# Patient Record
Sex: MALE | Race: WHITE | Employment: FULL TIME | URBAN - METROPOLITAN AREA
[De-identification: names, ages, dates, MRNs, and addresses within clinical notes are randomized per-mention and may not be internally consistent; named-entity substitution may affect disease eponyms.]

---

## 2018-04-07 ENCOUNTER — HOSPITAL ENCOUNTER (OUTPATIENT)
Age: 58
Discharge: HOME | End: 2018-04-07
Payer: COMMERCIAL

## 2018-04-07 PROCEDURE — 87491 CHLMYD TRACH DNA AMP PROBE: CPT

## 2018-04-07 PROCEDURE — 87591 N.GONORRHOEAE DNA AMP PROB: CPT

## 2018-04-11 LAB
CHLAMYDIA TRACHOMATIS AMPLIFIED DET: NORMAL
N GONORRHOEAE AMPLIFIED DET: NORMAL

## 2019-11-06 ENCOUNTER — OFFICE VISIT (OUTPATIENT)
Dept: ORTHOPEDIC SURGERY | Age: 59
End: 2019-11-06
Payer: COMMERCIAL

## 2019-11-06 DIAGNOSIS — S82.832A OTHER CLOSED FRACTURE OF DISTAL END OF LEFT FIBULA, INITIAL ENCOUNTER: ICD-10-CM

## 2019-11-06 DIAGNOSIS — M25.572 ACUTE LEFT ANKLE PAIN: Primary | ICD-10-CM

## 2019-11-06 PROCEDURE — 99214 OFFICE O/P EST MOD 30 MIN: CPT | Performed by: ORTHOPAEDIC SURGERY

## 2019-11-06 PROCEDURE — 77071 MNL APPL STRS JT RADIOGRAPHY: CPT | Performed by: ORTHOPAEDIC SURGERY

## 2019-11-06 RX ORDER — ASPIRIN 81 MG/1
81 TABLET ORAL DAILY
Qty: 30 TABLET | Refills: 0 | Status: ON HOLD | OUTPATIENT
Start: 2019-11-06 | End: 2019-11-18 | Stop reason: HOSPADM

## 2019-11-06 SDOH — HEALTH STABILITY: MENTAL HEALTH: HOW MANY STANDARD DRINKS CONTAINING ALCOHOL DO YOU HAVE ON A TYPICAL DAY?: 5 OR 6

## 2019-11-12 DIAGNOSIS — S82.832A OTHER CLOSED FRACTURE OF DISTAL END OF LEFT FIBULA, INITIAL ENCOUNTER: Primary | ICD-10-CM

## 2019-11-13 ENCOUNTER — OFFICE VISIT (OUTPATIENT)
Dept: ORTHOPEDIC SURGERY | Age: 59
End: 2019-11-13
Payer: COMMERCIAL

## 2019-11-13 VITALS — BODY MASS INDEX: 22.62 KG/M2 | HEIGHT: 70 IN | WEIGHT: 158 LBS

## 2019-11-13 DIAGNOSIS — S82.832D OTHER CLOSED FRACTURE OF DISTAL END OF LEFT FIBULA WITH ROUTINE HEALING, SUBSEQUENT ENCOUNTER: Primary | ICD-10-CM

## 2019-11-13 PROCEDURE — 99214 OFFICE O/P EST MOD 30 MIN: CPT | Performed by: ORTHOPAEDIC SURGERY

## 2019-11-13 PROCEDURE — 77071 MNL APPL STRS JT RADIOGRAPHY: CPT | Performed by: ORTHOPAEDIC SURGERY

## 2019-11-14 DIAGNOSIS — S82.832D OTHER CLOSED FRACTURE OF DISTAL END OF LEFT FIBULA WITH ROUTINE HEALING, SUBSEQUENT ENCOUNTER: ICD-10-CM

## 2019-11-14 DIAGNOSIS — M25.572 ACUTE LEFT ANKLE PAIN: Primary | ICD-10-CM

## 2019-11-14 DIAGNOSIS — S82.832D OTHER CLOSED FRACTURE OF DISTAL END OF LEFT FIBULA WITH ROUTINE HEALING, SUBSEQUENT ENCOUNTER: Primary | ICD-10-CM

## 2019-11-15 ENCOUNTER — OFFICE VISIT (OUTPATIENT)
Dept: ORTHOPEDIC SURGERY | Age: 59
End: 2019-11-15
Payer: COMMERCIAL

## 2019-11-15 VITALS — HEIGHT: 70 IN | BODY MASS INDEX: 22.62 KG/M2 | WEIGHT: 158 LBS

## 2019-11-15 DIAGNOSIS — S82.832D OTHER CLOSED FRACTURE OF DISTAL END OF LEFT FIBULA WITH ROUTINE HEALING, SUBSEQUENT ENCOUNTER: Primary | ICD-10-CM

## 2019-11-15 PROCEDURE — 99214 OFFICE O/P EST MOD 30 MIN: CPT | Performed by: ORTHOPAEDIC SURGERY

## 2019-11-15 PROCEDURE — 77071 MNL APPL STRS JT RADIOGRAPHY: CPT | Performed by: ORTHOPAEDIC SURGERY

## 2019-11-18 ENCOUNTER — ANESTHESIA EVENT (OUTPATIENT)
Dept: OPERATING ROOM | Age: 59
End: 2019-11-18
Payer: COMMERCIAL

## 2019-11-18 ENCOUNTER — HOSPITAL ENCOUNTER (OUTPATIENT)
Age: 59
Setting detail: OUTPATIENT SURGERY
Discharge: HOME | End: 2019-11-18
Attending: ORTHOPAEDIC SURGERY | Admitting: ORTHOPAEDIC SURGERY
Payer: COMMERCIAL

## 2019-11-18 ENCOUNTER — HOSPITAL ENCOUNTER (OUTPATIENT)
Dept: GENERAL RADIOLOGY | Age: 59
Discharge: HOME | End: 2019-11-20
Payer: COMMERCIAL

## 2019-11-18 ENCOUNTER — ANESTHESIA (OUTPATIENT)
Dept: OPERATING ROOM | Age: 59
End: 2019-11-18
Payer: COMMERCIAL

## 2019-11-18 VITALS
DIASTOLIC BLOOD PRESSURE: 64 MMHG | RESPIRATION RATE: 16 BRPM | OXYGEN SATURATION: 93 % | HEART RATE: 90 BPM | SYSTOLIC BLOOD PRESSURE: 122 MMHG | TEMPERATURE: 97.5 F

## 2019-11-18 VITALS
DIASTOLIC BLOOD PRESSURE: 87 MMHG | SYSTOLIC BLOOD PRESSURE: 139 MMHG | OXYGEN SATURATION: 98 % | RESPIRATION RATE: 2 BRPM

## 2019-11-18 DIAGNOSIS — S82.892A CLOSED FRACTURE OF LEFT ANKLE, INITIAL ENCOUNTER: ICD-10-CM

## 2019-11-18 DIAGNOSIS — S82.832A OTHER CLOSED FRACTURE OF DISTAL END OF LEFT FIBULA, INITIAL ENCOUNTER: Primary | ICD-10-CM

## 2019-11-18 PROCEDURE — 6360000002 HC RX W HCPCS: Performed by: ORTHOPAEDIC SURGERY

## 2019-11-18 PROCEDURE — 3700000001 HC ADD 15 MINUTES (ANESTHESIA): Performed by: ORTHOPAEDIC SURGERY

## 2019-11-18 PROCEDURE — 3209999900 FLUORO FOR SURGICAL PROCEDURES

## 2019-11-18 PROCEDURE — 2580000003 HC RX 258: Performed by: ANESTHESIOLOGY

## 2019-11-18 PROCEDURE — 6360000002 HC RX W HCPCS: Performed by: NURSE ANESTHETIST, CERTIFIED REGISTERED

## 2019-11-18 PROCEDURE — C9359 IMPLNT,BON VOID FILLER-PUTTY: HCPCS | Performed by: ORTHOPAEDIC SURGERY

## 2019-11-18 PROCEDURE — 6360000002 HC RX W HCPCS: Performed by: ANESTHESIOLOGY

## 2019-11-18 PROCEDURE — 2500000003 HC RX 250 WO HCPCS: Performed by: NURSE ANESTHETIST, CERTIFIED REGISTERED

## 2019-11-18 PROCEDURE — 3600000004 HC SURGERY LEVEL 4 BASE: Performed by: ORTHOPAEDIC SURGERY

## 2019-11-18 PROCEDURE — 2709999900 HC NON-CHARGEABLE SUPPLY: Performed by: ORTHOPAEDIC SURGERY

## 2019-11-18 PROCEDURE — 7100000000 HC PACU RECOVERY - FIRST 15 MIN: Performed by: ORTHOPAEDIC SURGERY

## 2019-11-18 PROCEDURE — 93005 ELECTROCARDIOGRAM TRACING: CPT | Performed by: ANESTHESIOLOGY

## 2019-11-18 PROCEDURE — 3700000000 HC ANESTHESIA ATTENDED CARE: Performed by: ORTHOPAEDIC SURGERY

## 2019-11-18 PROCEDURE — 7100000001 HC PACU RECOVERY - ADDTL 15 MIN: Performed by: ORTHOPAEDIC SURGERY

## 2019-11-18 PROCEDURE — 6360000002 HC RX W HCPCS

## 2019-11-18 PROCEDURE — 2580000003 HC RX 258: Performed by: NURSE ANESTHETIST, CERTIFIED REGISTERED

## 2019-11-18 PROCEDURE — 3600000014 HC SURGERY LEVEL 4 ADDTL 15MIN: Performed by: ORTHOPAEDIC SURGERY

## 2019-11-18 PROCEDURE — 7100000010 HC PHASE II RECOVERY - FIRST 15 MIN: Performed by: ORTHOPAEDIC SURGERY

## 2019-11-18 PROCEDURE — 7100000011 HC PHASE II RECOVERY - ADDTL 15 MIN: Performed by: ORTHOPAEDIC SURGERY

## 2019-11-18 PROCEDURE — 27792 TREATMENT OF ANKLE FRACTURE: CPT | Performed by: ORTHOPAEDIC SURGERY

## 2019-11-18 PROCEDURE — C1713 ANCHOR/SCREW BN/BN,TIS/BN: HCPCS | Performed by: ORTHOPAEDIC SURGERY

## 2019-11-18 DEVICE — PLATE BNE L81MM 7 H S STL 1/3 TBLR LOK COMPR W/ CLLR FOR: Type: IMPLANTABLE DEVICE | Site: ANKLE | Status: FUNCTIONAL

## 2019-11-18 DEVICE — SCREW BNE L14MM DIA3.5MM CORT S STL ST NONCANNULATED LOK: Type: IMPLANTABLE DEVICE | Site: ANKLE | Status: FUNCTIONAL

## 2019-11-18 DEVICE — DBX PUTTY, 2.5CC
Type: IMPLANTABLE DEVICE | Site: ANKLE | Status: FUNCTIONAL
Brand: DBX®

## 2019-11-18 DEVICE — SCREW BNE L16MM DIA4MM CANC S STL ST CANN NONLOCKING FULL: Type: IMPLANTABLE DEVICE | Site: ANKLE | Status: FUNCTIONAL

## 2019-11-18 RX ORDER — ASPIRIN 325 MG
325 TABLET, DELAYED RELEASE (ENTERIC COATED) ORAL DAILY
Qty: 14 TABLET | Refills: 0 | Status: SHIPPED | OUTPATIENT
Start: 2019-11-18 | End: 2020-04-29

## 2019-11-18 RX ORDER — ONDANSETRON 2 MG/ML
4 INJECTION INTRAMUSCULAR; INTRAVENOUS
Status: DISCONTINUED | OUTPATIENT
Start: 2019-11-18 | End: 2019-11-18 | Stop reason: HOSPADM

## 2019-11-18 RX ORDER — KETOROLAC TROMETHAMINE 30 MG/ML
INJECTION, SOLUTION INTRAMUSCULAR; INTRAVENOUS
Status: COMPLETED
Start: 2019-11-18 | End: 2019-11-18

## 2019-11-18 RX ORDER — KETOROLAC TROMETHAMINE 30 MG/ML
30 INJECTION, SOLUTION INTRAMUSCULAR; INTRAVENOUS ONCE
Status: COMPLETED | OUTPATIENT
Start: 2019-11-18 | End: 2019-11-18

## 2019-11-18 RX ORDER — LIDOCAINE HYDROCHLORIDE 20 MG/ML
INJECTION, SOLUTION INTRAVENOUS PRN
Status: DISCONTINUED | OUTPATIENT
Start: 2019-11-18 | End: 2019-11-18 | Stop reason: SDUPTHER

## 2019-11-18 RX ORDER — HYDROMORPHONE HYDROCHLORIDE 1 MG/ML
0.5 INJECTION, SOLUTION INTRAMUSCULAR; INTRAVENOUS; SUBCUTANEOUS EVERY 5 MIN PRN
Status: DISCONTINUED | OUTPATIENT
Start: 2019-11-18 | End: 2019-11-18 | Stop reason: HOSPADM

## 2019-11-18 RX ORDER — PROPOFOL 10 MG/ML
INJECTION, EMULSION INTRAVENOUS PRN
Status: DISCONTINUED | OUTPATIENT
Start: 2019-11-18 | End: 2019-11-18 | Stop reason: SDUPTHER

## 2019-11-18 RX ORDER — ONDANSETRON 2 MG/ML
INJECTION INTRAMUSCULAR; INTRAVENOUS PRN
Status: DISCONTINUED | OUTPATIENT
Start: 2019-11-18 | End: 2019-11-18 | Stop reason: SDUPTHER

## 2019-11-18 RX ORDER — ONDANSETRON 4 MG/1
4 TABLET, FILM COATED ORAL EVERY 6 HOURS PRN
Qty: 20 TABLET | Refills: 1 | Status: SHIPPED | OUTPATIENT
Start: 2019-11-18 | End: 2020-04-29

## 2019-11-18 RX ORDER — HYDROMORPHONE HYDROCHLORIDE 1 MG/ML
0.25 INJECTION, SOLUTION INTRAMUSCULAR; INTRAVENOUS; SUBCUTANEOUS EVERY 5 MIN PRN
Status: DISCONTINUED | OUTPATIENT
Start: 2019-11-18 | End: 2019-11-18 | Stop reason: HOSPADM

## 2019-11-18 RX ORDER — SODIUM CHLORIDE, SODIUM LACTATE, POTASSIUM CHLORIDE, CALCIUM CHLORIDE 600; 310; 30; 20 MG/100ML; MG/100ML; MG/100ML; MG/100ML
INJECTION, SOLUTION INTRAVENOUS CONTINUOUS
Status: DISCONTINUED | OUTPATIENT
Start: 2019-11-18 | End: 2019-11-18 | Stop reason: HOSPADM

## 2019-11-18 RX ORDER — DOCUSATE SODIUM 100 MG/1
100 CAPSULE, LIQUID FILLED ORAL 2 TIMES DAILY PRN
Qty: 20 CAPSULE | Refills: 1 | Status: SHIPPED | OUTPATIENT
Start: 2019-11-18 | End: 2020-04-29

## 2019-11-18 RX ORDER — ROCURONIUM BROMIDE 10 MG/ML
INJECTION, SOLUTION INTRAVENOUS PRN
Status: DISCONTINUED | OUTPATIENT
Start: 2019-11-18 | End: 2019-11-18 | Stop reason: SDUPTHER

## 2019-11-18 RX ORDER — OXYCODONE HYDROCHLORIDE AND ACETAMINOPHEN 5; 325 MG/1; MG/1
1 TABLET ORAL EVERY 6 HOURS PRN
Qty: 28 TABLET | Refills: 0 | Status: SHIPPED | OUTPATIENT
Start: 2019-11-18 | End: 2019-11-25

## 2019-11-18 RX ORDER — MEPERIDINE HYDROCHLORIDE 50 MG/ML
12.5 INJECTION INTRAMUSCULAR; INTRAVENOUS; SUBCUTANEOUS EVERY 5 MIN PRN
Status: DISCONTINUED | OUTPATIENT
Start: 2019-11-18 | End: 2019-11-18 | Stop reason: HOSPADM

## 2019-11-18 RX ORDER — MIDAZOLAM HYDROCHLORIDE 1 MG/ML
INJECTION INTRAMUSCULAR; INTRAVENOUS PRN
Status: DISCONTINUED | OUTPATIENT
Start: 2019-11-18 | End: 2019-11-18 | Stop reason: SDUPTHER

## 2019-11-18 RX ORDER — LABETALOL 20 MG/4 ML (5 MG/ML) INTRAVENOUS SYRINGE
PRN
Status: DISCONTINUED | OUTPATIENT
Start: 2019-11-18 | End: 2019-11-18 | Stop reason: SDUPTHER

## 2019-11-18 RX ORDER — SODIUM CHLORIDE 0.9 % (FLUSH) 0.9 %
10 SYRINGE (ML) INJECTION EVERY 12 HOURS SCHEDULED
Status: DISCONTINUED | OUTPATIENT
Start: 2019-11-18 | End: 2019-11-18 | Stop reason: HOSPADM

## 2019-11-18 RX ORDER — GLYCOPYRROLATE 0.2 MG/ML
INJECTION INTRAMUSCULAR; INTRAVENOUS PRN
Status: DISCONTINUED | OUTPATIENT
Start: 2019-11-18 | End: 2019-11-18 | Stop reason: SDUPTHER

## 2019-11-18 RX ORDER — ASCORBIC ACID 500 MG
500 TABLET ORAL DAILY
Qty: 50 TABLET | Refills: 1 | Status: SHIPPED | OUTPATIENT
Start: 2019-11-18 | End: 2020-04-29

## 2019-11-18 RX ORDER — NEOSTIGMINE METHYLSULFATE 1 MG/ML
INJECTION, SOLUTION INTRAVENOUS PRN
Status: DISCONTINUED | OUTPATIENT
Start: 2019-11-18 | End: 2019-11-18 | Stop reason: SDUPTHER

## 2019-11-18 RX ORDER — SODIUM CHLORIDE, SODIUM LACTATE, POTASSIUM CHLORIDE, CALCIUM CHLORIDE 600; 310; 30; 20 MG/100ML; MG/100ML; MG/100ML; MG/100ML
INJECTION, SOLUTION INTRAVENOUS CONTINUOUS PRN
Status: DISCONTINUED | OUTPATIENT
Start: 2019-11-18 | End: 2019-11-18 | Stop reason: SDUPTHER

## 2019-11-18 RX ORDER — VANCOMYCIN HYDROCHLORIDE 1 G/20ML
INJECTION, POWDER, LYOPHILIZED, FOR SOLUTION INTRAVENOUS PRN
Status: DISCONTINUED | OUTPATIENT
Start: 2019-11-18 | End: 2019-11-18 | Stop reason: ALTCHOICE

## 2019-11-18 RX ORDER — FENTANYL CITRATE 50 UG/ML
INJECTION, SOLUTION INTRAMUSCULAR; INTRAVENOUS
Status: COMPLETED
Start: 2019-11-18 | End: 2019-11-18

## 2019-11-18 RX ORDER — FENTANYL CITRATE 50 UG/ML
INJECTION, SOLUTION INTRAMUSCULAR; INTRAVENOUS PRN
Status: DISCONTINUED | OUTPATIENT
Start: 2019-11-18 | End: 2019-11-18 | Stop reason: SDUPTHER

## 2019-11-18 RX ORDER — SODIUM CHLORIDE 0.9 % (FLUSH) 0.9 %
10 SYRINGE (ML) INJECTION PRN
Status: DISCONTINUED | OUTPATIENT
Start: 2019-11-18 | End: 2019-11-18 | Stop reason: HOSPADM

## 2019-11-18 RX ORDER — FENTANYL CITRATE 50 UG/ML
25 INJECTION, SOLUTION INTRAMUSCULAR; INTRAVENOUS EVERY 5 MIN PRN
Status: DISCONTINUED | OUTPATIENT
Start: 2019-11-18 | End: 2019-11-18 | Stop reason: HOSPADM

## 2019-11-18 RX ORDER — MEPERIDINE HYDROCHLORIDE 25 MG/ML
INJECTION INTRAMUSCULAR; INTRAVENOUS; SUBCUTANEOUS
Status: COMPLETED
Start: 2019-11-18 | End: 2019-11-18

## 2019-11-18 RX ORDER — ZINC SULFATE 50(220)MG
220 CAPSULE ORAL DAILY
Qty: 30 CAPSULE | Refills: 1 | COMMUNITY
Start: 2019-11-18 | End: 2020-04-29

## 2019-11-18 RX ORDER — DEXAMETHASONE SODIUM PHOSPHATE 10 MG/ML
INJECTION, SOLUTION INTRAMUSCULAR; INTRAVENOUS PRN
Status: DISCONTINUED | OUTPATIENT
Start: 2019-11-18 | End: 2019-11-18 | Stop reason: SDUPTHER

## 2019-11-18 RX ORDER — FENTANYL CITRATE 50 UG/ML
50 INJECTION, SOLUTION INTRAMUSCULAR; INTRAVENOUS EVERY 5 MIN PRN
Status: DISCONTINUED | OUTPATIENT
Start: 2019-11-18 | End: 2019-11-18 | Stop reason: HOSPADM

## 2019-11-18 RX ADMIN — FENTANYL CITRATE 25 MCG: 50 INJECTION, SOLUTION INTRAMUSCULAR; INTRAVENOUS at 15:50

## 2019-11-18 RX ADMIN — FENTANYL CITRATE 50 MCG: 50 INJECTION, SOLUTION INTRAMUSCULAR; INTRAVENOUS at 15:08

## 2019-11-18 RX ADMIN — FENTANYL CITRATE 50 MCG: 50 INJECTION, SOLUTION INTRAMUSCULAR; INTRAVENOUS at 14:14

## 2019-11-18 RX ADMIN — FENTANYL CITRATE 50 MCG: 50 INJECTION, SOLUTION INTRAMUSCULAR; INTRAVENOUS at 15:15

## 2019-11-18 RX ADMIN — MEPERIDINE HYDROCHLORIDE 12.5 MG: 25 INJECTION INTRAMUSCULAR; INTRAVENOUS; SUBCUTANEOUS at 15:02

## 2019-11-18 RX ADMIN — DEXAMETHASONE SODIUM PHOSPHATE 10 MG: 10 INJECTION, SOLUTION INTRAMUSCULAR; INTRAVENOUS at 13:26

## 2019-11-18 RX ADMIN — Medication 2 G: at 13:14

## 2019-11-18 RX ADMIN — FENTANYL CITRATE 100 MCG: 50 INJECTION, SOLUTION INTRAMUSCULAR; INTRAVENOUS at 13:42

## 2019-11-18 RX ADMIN — SODIUM CHLORIDE, POTASSIUM CHLORIDE, SODIUM LACTATE AND CALCIUM CHLORIDE: 600; 310; 30; 20 INJECTION, SOLUTION INTRAVENOUS at 13:14

## 2019-11-18 RX ADMIN — Medication 3 MG: at 14:37

## 2019-11-18 RX ADMIN — GLYCOPYRROLATE 0.6 MG: 0.2 INJECTION, SOLUTION INTRAMUSCULAR; INTRAVENOUS at 14:37

## 2019-11-18 RX ADMIN — Medication 40 MG: at 13:19

## 2019-11-18 RX ADMIN — FENTANYL CITRATE 100 MCG: 50 INJECTION, SOLUTION INTRAMUSCULAR; INTRAVENOUS at 13:19

## 2019-11-18 RX ADMIN — PROPOFOL 180 MG: 10 INJECTION, EMULSION INTRAVENOUS at 13:19

## 2019-11-18 RX ADMIN — LIDOCAINE HYDROCHLORIDE 50 MG: 20 INJECTION, SOLUTION INTRAVENOUS at 13:19

## 2019-11-18 RX ADMIN — SODIUM CHLORIDE, POTASSIUM CHLORIDE, SODIUM LACTATE AND CALCIUM CHLORIDE: 600; 310; 30; 20 INJECTION, SOLUTION INTRAVENOUS at 11:23

## 2019-11-18 RX ADMIN — HYDROMORPHONE HYDROCHLORIDE 0.5 MG: 1 INJECTION, SOLUTION INTRAMUSCULAR; INTRAVENOUS; SUBCUTANEOUS at 15:40

## 2019-11-18 RX ADMIN — LABETALOL 20 MG/4 ML (5 MG/ML) INTRAVENOUS SYRINGE 20 MG: at 14:35

## 2019-11-18 RX ADMIN — KETOROLAC TROMETHAMINE 30 MG: 30 INJECTION, SOLUTION INTRAMUSCULAR at 15:27

## 2019-11-18 RX ADMIN — MIDAZOLAM 2 MG: 1 INJECTION INTRAMUSCULAR; INTRAVENOUS at 13:14

## 2019-11-18 RX ADMIN — ONDANSETRON HYDROCHLORIDE 4 MG: 2 INJECTION, SOLUTION INTRAMUSCULAR; INTRAVENOUS at 14:37

## 2019-11-18 RX ADMIN — MEPERIDINE HYDROCHLORIDE 12.5 MG: 50 INJECTION, SOLUTION INTRAMUSCULAR; INTRAVENOUS; SUBCUTANEOUS at 14:57

## 2019-11-18 RX ADMIN — HYDROMORPHONE HYDROCHLORIDE 0.5 MG: 1 INJECTION, SOLUTION INTRAMUSCULAR; INTRAVENOUS; SUBCUTANEOUS at 15:31

## 2019-11-18 RX ADMIN — VANCOMYCIN HYDROCHLORIDE 1000 MG: 1 INJECTION, POWDER, LYOPHILIZED, FOR SOLUTION INTRAVENOUS at 13:14

## 2019-11-18 RX ADMIN — KETOROLAC TROMETHAMINE 30 MG: 30 INJECTION, SOLUTION INTRAMUSCULAR; INTRAVENOUS at 15:27

## 2019-11-18 ASSESSMENT — PAIN DESCRIPTION - ORIENTATION
ORIENTATION: LEFT

## 2019-11-18 ASSESSMENT — PAIN SCALES - GENERAL
PAINLEVEL_OUTOF10: 9
PAINLEVEL_OUTOF10: 5
PAINLEVEL_OUTOF10: 8
PAINLEVEL_OUTOF10: 9
PAINLEVEL_OUTOF10: 5
PAINLEVEL_OUTOF10: 5
PAINLEVEL_OUTOF10: 8
PAINLEVEL_OUTOF10: 8
PAINLEVEL_OUTOF10: 0
PAINLEVEL_OUTOF10: 4
PAINLEVEL_OUTOF10: 8
PAINLEVEL_OUTOF10: 8

## 2019-11-18 ASSESSMENT — PULMONARY FUNCTION TESTS
PIF_VALUE: 16
PIF_VALUE: 19
PIF_VALUE: 16
PIF_VALUE: 0
PIF_VALUE: 16
PIF_VALUE: 5
PIF_VALUE: 16
PIF_VALUE: 0
PIF_VALUE: 16
PIF_VALUE: 4
PIF_VALUE: 16
PIF_VALUE: 0
PIF_VALUE: 16
PIF_VALUE: 16
PIF_VALUE: 20
PIF_VALUE: 16
PIF_VALUE: 16
PIF_VALUE: 30
PIF_VALUE: 23
PIF_VALUE: 24
PIF_VALUE: 16
PIF_VALUE: 20
PIF_VALUE: 16
PIF_VALUE: 19
PIF_VALUE: 17
PIF_VALUE: 24
PIF_VALUE: 16
PIF_VALUE: 14
PIF_VALUE: 17
PIF_VALUE: 16
PIF_VALUE: 20
PIF_VALUE: 16
PIF_VALUE: 16
PIF_VALUE: 1
PIF_VALUE: 16
PIF_VALUE: 20
PIF_VALUE: 33
PIF_VALUE: 16
PIF_VALUE: 3
PIF_VALUE: 19
PIF_VALUE: 1
PIF_VALUE: 16
PIF_VALUE: 16
PIF_VALUE: 19
PIF_VALUE: 16
PIF_VALUE: 16
PIF_VALUE: 19
PIF_VALUE: 16
PIF_VALUE: 20
PIF_VALUE: 16
PIF_VALUE: 19
PIF_VALUE: 16
PIF_VALUE: 16

## 2019-11-18 ASSESSMENT — PAIN DESCRIPTION - DESCRIPTORS
DESCRIPTORS: SHARP;STABBING;THROBBING
DESCRIPTORS: ACHING
DESCRIPTORS: SHARP;STABBING;THROBBING

## 2019-11-18 ASSESSMENT — PAIN DESCRIPTION - PROGRESSION
CLINICAL_PROGRESSION: NOT CHANGED

## 2019-11-18 ASSESSMENT — PAIN DESCRIPTION - ONSET: ONSET: GRADUAL

## 2019-11-18 ASSESSMENT — PAIN - FUNCTIONAL ASSESSMENT: PAIN_FUNCTIONAL_ASSESSMENT: PREVENTS OR INTERFERES SOME ACTIVE ACTIVITIES AND ADLS

## 2019-11-18 ASSESSMENT — PAIN DESCRIPTION - PAIN TYPE
TYPE: SURGICAL PAIN

## 2019-11-18 ASSESSMENT — PAIN DESCRIPTION - FREQUENCY
FREQUENCY: CONTINUOUS
FREQUENCY: INTERMITTENT
FREQUENCY: CONTINUOUS
FREQUENCY: CONTINUOUS

## 2019-11-18 ASSESSMENT — PAIN DESCRIPTION - LOCATION
LOCATION: ANKLE

## 2019-11-20 LAB
EKG ATRIAL RATE: 88 BPM
EKG P AXIS: 70 DEGREES
EKG P-R INTERVAL: 150 MS
EKG Q-T INTERVAL: 360 MS
EKG QRS DURATION: 82 MS
EKG QTC CALCULATION (BAZETT): 435 MS
EKG R AXIS: 61 DEGREES
EKG T AXIS: 56 DEGREES
EKG VENTRICULAR RATE: 88 BPM

## 2019-11-26 DIAGNOSIS — S82.832D OTHER CLOSED FRACTURE OF DISTAL END OF LEFT FIBULA WITH ROUTINE HEALING, SUBSEQUENT ENCOUNTER: Primary | ICD-10-CM

## 2019-11-27 ENCOUNTER — OFFICE VISIT (OUTPATIENT)
Dept: ORTHOPEDIC SURGERY | Age: 59
End: 2019-11-27
Payer: COMMERCIAL

## 2019-11-27 VITALS — BODY MASS INDEX: 22.62 KG/M2 | HEIGHT: 70 IN | WEIGHT: 158 LBS

## 2019-11-27 DIAGNOSIS — Z87.81 STATUS POST OPEN REDUCTION WITH INTERNAL FIXATION (ORIF) OF FRACTURE OF ANKLE: Primary | ICD-10-CM

## 2019-11-27 DIAGNOSIS — Z98.890 STATUS POST OPEN REDUCTION WITH INTERNAL FIXATION (ORIF) OF FRACTURE OF ANKLE: Primary | ICD-10-CM

## 2019-11-27 DIAGNOSIS — S82.832D OTHER CLOSED FRACTURE OF DISTAL END OF LEFT FIBULA WITH ROUTINE HEALING, SUBSEQUENT ENCOUNTER: ICD-10-CM

## 2019-11-27 PROCEDURE — 99024 POSTOP FOLLOW-UP VISIT: CPT | Performed by: ORTHOPAEDIC SURGERY

## 2019-11-27 PROCEDURE — 29515 APPLICATION SHORT LEG SPLINT: CPT | Performed by: ORTHOPAEDIC SURGERY

## 2019-12-10 DIAGNOSIS — Z98.890 STATUS POST OPEN REDUCTION WITH INTERNAL FIXATION (ORIF) OF FRACTURE OF ANKLE: Primary | ICD-10-CM

## 2019-12-10 DIAGNOSIS — Z87.81 STATUS POST OPEN REDUCTION WITH INTERNAL FIXATION (ORIF) OF FRACTURE OF ANKLE: Primary | ICD-10-CM

## 2019-12-11 ENCOUNTER — OFFICE VISIT (OUTPATIENT)
Dept: ORTHOPEDIC SURGERY | Age: 59
End: 2019-12-11

## 2019-12-11 VITALS — WEIGHT: 158 LBS | BODY MASS INDEX: 22.62 KG/M2 | HEIGHT: 70 IN

## 2019-12-11 DIAGNOSIS — Z87.81 STATUS POST OPEN REDUCTION WITH INTERNAL FIXATION (ORIF) OF FRACTURE OF ANKLE: Primary | ICD-10-CM

## 2019-12-11 DIAGNOSIS — Z98.890 STATUS POST OPEN REDUCTION WITH INTERNAL FIXATION (ORIF) OF FRACTURE OF ANKLE: Primary | ICD-10-CM

## 2019-12-11 PROCEDURE — 99024 POSTOP FOLLOW-UP VISIT: CPT | Performed by: ORTHOPAEDIC SURGERY

## 2019-12-23 DIAGNOSIS — Z87.81 STATUS POST OPEN REDUCTION WITH INTERNAL FIXATION (ORIF) OF FRACTURE OF ANKLE: Primary | ICD-10-CM

## 2019-12-23 DIAGNOSIS — Z98.890 STATUS POST OPEN REDUCTION WITH INTERNAL FIXATION (ORIF) OF FRACTURE OF ANKLE: Primary | ICD-10-CM

## 2019-12-27 ENCOUNTER — OFFICE VISIT (OUTPATIENT)
Dept: ORTHOPEDIC SURGERY | Age: 59
End: 2019-12-27

## 2019-12-27 VITALS — BODY MASS INDEX: 22.63 KG/M2 | WEIGHT: 158.07 LBS | HEIGHT: 70 IN

## 2019-12-27 DIAGNOSIS — Z98.890 STATUS POST OPEN REDUCTION WITH INTERNAL FIXATION (ORIF) OF FRACTURE OF ANKLE: Primary | ICD-10-CM

## 2019-12-27 DIAGNOSIS — S82.832D OTHER CLOSED FRACTURE OF DISTAL END OF LEFT FIBULA WITH ROUTINE HEALING, SUBSEQUENT ENCOUNTER: ICD-10-CM

## 2019-12-27 DIAGNOSIS — Z87.81 STATUS POST OPEN REDUCTION WITH INTERNAL FIXATION (ORIF) OF FRACTURE OF ANKLE: Primary | ICD-10-CM

## 2019-12-27 PROCEDURE — 99024 POSTOP FOLLOW-UP VISIT: CPT | Performed by: ORTHOPAEDIC SURGERY

## 2020-02-21 ENCOUNTER — OFFICE VISIT (OUTPATIENT)
Dept: ORTHOPEDIC SURGERY | Age: 60
End: 2020-02-21

## 2020-02-21 VITALS — WEIGHT: 158 LBS | BODY MASS INDEX: 22.62 KG/M2 | HEIGHT: 70 IN

## 2020-02-21 PROCEDURE — 99214 OFFICE O/P EST MOD 30 MIN: CPT | Performed by: ORTHOPAEDIC SURGERY

## 2020-02-21 NOTE — PROGRESS NOTES
Chief Complaint   Patient presents with    Ankle Pain     Left ankle ORIF follow up. DOS 11/18/2019. complains of lateral ankle pain, especially when having shoes. Viivan Mock is a 61 y.o. male who presents today in follow up of left ankle pain. The patient had ORIF on 11/18/2019. The patient complains of pain over the lateral ankle, especially while wearing shoes that go above the ankle. The intensity is 5/10. The pain is described as: achy. Patient states that it is mostly around the distal portion of the screws. Per patient, there has been no drainage or opening of the skin noted. Patient states that there is been no signs of cellulitis or flulike symptoms.     Past Medical History:   Diagnosis Date    Fractures     right anklex2    Gout      Past Surgical History:   Procedure Laterality Date    ANKLE FRACTURE SURGERY Left 11/18/2019    LEFT ANKLE OPEN REDUCTION INTERNAL FIXATION (SYNTHES) performed by Riley Pollock DO at [address]         Current Outpatient Medications:     aspirin 325 MG EC tablet, Take 1 tablet by mouth daily for 14 days, Disp: 14 tablet, Rfl: 0    docusate sodium (COLACE) 100 MG capsule, Take 1 capsule by mouth 2 times daily as needed for Constipation (Patient not taking: Reported on 12/27/2019), Disp: 20 capsule, Rfl: 1    ondansetron (ZOFRAN) 4 MG tablet, Take 1 tablet by mouth every 6 hours as needed for Nausea or Vomiting (Patient not taking: Reported on 12/27/2019), Disp: 20 tablet, Rfl: 1    zinc sulfate (ORAZINC) 220 (50 Zn) MG capsule, Take 1 capsule by mouth daily (Patient not taking: Reported on 12/27/2019), Disp: 30 capsule, Rfl: 1    vitamin C (ASCORBIC ACID) 500 MG tablet, Take 1 tablet by mouth daily (Patient not taking: Reported on 12/27/2019), Disp: 50 tablet, Rfl: 1  No Known Allergies  Social History     Socioeconomic History    Marital status:      Spouse name: Not on file    Number of children: Not on file    Years of education: Not on file    Highest education level: Not on file   Occupational History    Not on file   Social Needs    Financial resource strain: Not on file    Food insecurity:     Worry: Not on file     Inability: Not on file    Transportation needs:     Medical: Not on file     Non-medical: Not on file   Tobacco Use    Smoking status: Former Smoker     Types: Cigarettes    Smokeless tobacco: Former User     Types: Snuff   Substance and Sexual Activity    Alcohol use: Yes     Alcohol/week: 6.0 standard drinks     Types: 6 Cans of beer per week     Drinks per session: 5 or 6     Binge frequency: Daily or almost daily     Comment: daily 6 beers a day    Drug use: Never    Sexual activity: Not on file   Lifestyle    Physical activity:     Days per week: Not on file     Minutes per session: Not on file    Stress: Not on file   Relationships    Social connections:     Talks on phone: Not on file     Gets together: Not on file     Attends [employer] service: Not on file     Active member of club or organization: Not on file     Attends meetings of clubs or organizations: Not on file     Relationship status: Not on file    Intimate partner violence:     Fear of current or ex partner: Not on file     Emotionally abused: Not on file     Physically abused: Not on file     Forced sexual activity: Not on file   Other Topics Concern    Not on file   Social History Narrative    Not on file     Family History   Problem Relation Age of Onset    Cancer Mother        REVIEW OF SYSTEMS:     General/Constitution:  (-)weight loss, (-)fever, (-)chills, (-)weakness. Skin: (-) rash,(-) psoriasis,(-) eczema, (-)skin cancer. Musculoskeletal: (-) fractures,  (-) dislocations,(-) collagen vascular disease, (-) fibromyalgia, (-) multiple sclerosis, (-) muscular dystrophy, (-) RSD,(-) joint pain (-)swelling, (-) joint pain,swelling.   Neurologic: (-) epilepsy, (-)seizures,(-) brain tumor,(-) TIA, (-)stroke, (-)headaches, (-)Parkinson disease,(-) memory loss, (-) LOC. Cardiovascular: (-) Chest pain, (-) swelling in legs/feet, (-) SOB, (-) cramping in legs/feet with walking. Respiratory: (-) SOB, (-) Coughing, (-) night sweats. GI: (-) nausea, (-) vomiting, (-) diarrhea, (-) blood in stool, (-) gastric ulcer. Psychiatric: (-) Depression, (-) Anxiety, (-) bipolar disease, (-) Alzheimer's Disease  Allergic/Immunologic: (-) allergies latex, (-) allergies metal, (-) skin sensitivity. Hematlogic: (-) anemia, (-) blood transfusion, (-) DVT/PE, (-) Clotting disorders      EXAM:      Constitution:  There were no vitals filed for this visit. Psycihatric:    The patient is alert and oriented x 3, appears to be stated age and in no distress. Respiratory:    Respiratory effort is not labored. Patient is not gasping. Palpation of the chest reveals no tactile fremitus. Skin:    Upon inspection: the skin appears warm, dry and intact. There is not a previous scar over the affected area. There is not any cellulitis, lymphedema or cutaneous lesions noted in the lower extremities. Upon palpation there is no induration noted. Neurologic:      Motor exam of the lower extremities show ; quadriceps, hamstrings, foot dorsi and plantar flexors intact R.  5/5 and L. 5/5. Deep tendon reflexes are 2/4 at the knees and 2/4 at the ankles with strong extensor hallicus longus motor strength bilaterally. Sensory to both feet is intact to all sensory roots. Cardiovascular: The vascular exam is normal and is well perfused to distal extremities. Distal pulses DP/PT: R. 2+; L. 2+. There is cap refill noted less than two seconds in all digits. There is not edema of the bilateral lower extremities. There is not varicosities noted in the distal extremities. Lymph:    Upon palpation,  there is no lymphadenopathy noted in bilateral lower extremities.       Musculoskeletal:    Gait: antalgic; examination of the nails and digits reveal no cyanosis or clubbing. Knee exam - bilateral knee exam shows;  range of motion of R. Knee is 0 to 140, and L. Knee is 0 to 140. The patient does not have  pain on motion, there is not an effusion, there is not tenderness over the  global region, there are not any masses, there is not ligamentous instability, there is not  deformity noted. Knee exam: knee reveals normal exam, no swelling, tenderness, instability; ligaments intact, FROM. Ankle Exam:    Upon inspection and palpation of the Left ankle,  there is not deformity noted,  No swelling, moderate ecchymosis, has pain on palpation of the lateral malleolus around the distal portion of the plate and screws. ROM Left ankle : DF20; PF 40;  INV 30, DORIS 10. This exam was compared bilaterally. Well-healed incisional scar. However there is some mild irritation from prominent hardware of the distal screws. Right Ankle:   (-) Anterior Drawer ,  (-) Posterior Drawer ,  (-) Squeeze test,  (-) External Rotation, (-) Eversion test , (-) Abraham Test     Left ankle:   (-) Anterior Drawer ,(-)  Posterior Drawer ,(-) Squeeze test,(-) External Rotation (-) Eversion test, (-) Abraham Test.      Foot exam- visual inspection reveals warm, good capillary refill, there is not pain to palpation over the metatarsals. ROM inversion/eversion full range of motion, abduction/adduction full range of motion, ROM in MTP/PIP/DIP full range of motion. Xrays:    No results found. Radiographic findings reviewed with patient      Impression:  Malik Hodge was seen today for ankle pain. Diagnoses and all orders for this visit:    Status post open reduction with internal fixation (ORIF) of fracture of ankle  -     CT ANKLE LEFT WO CONTRAST; Future    Other closed fracture of distal end of left fibula with routine healing, subsequent encounter  -     CT ANKLE LEFT WO CONTRAST; Future      Patient seen and examined. X-rays reviewed.   Natural history and course

## 2020-03-04 ENCOUNTER — OFFICE VISIT (OUTPATIENT)
Dept: ORTHOPEDIC SURGERY | Age: 60
End: 2020-03-04
Payer: COMMERCIAL

## 2020-03-04 VITALS — HEIGHT: 70 IN | WEIGHT: 158 LBS | BODY MASS INDEX: 22.62 KG/M2

## 2020-03-04 PROCEDURE — 99214 OFFICE O/P EST MOD 30 MIN: CPT | Performed by: ORTHOPAEDIC SURGERY

## 2020-03-04 NOTE — PROGRESS NOTES
Not on file   Occupational History    Not on file   Social Needs    Financial resource strain: Not on file    Food insecurity:     Worry: Not on file     Inability: Not on file    Transportation needs:     Medical: Not on file     Non-medical: Not on file   Tobacco Use    Smoking status: Former Smoker     Types: Cigarettes    Smokeless tobacco: Former User     Types: Snuff   Substance and Sexual Activity    Alcohol use: Yes     Alcohol/week: 6.0 standard drinks     Types: 6 Cans of beer per week     Drinks per session: 5 or 6     Binge frequency: Daily or almost daily     Comment: daily 6 beers a day    Drug use: Never    Sexual activity: Not on file   Lifestyle    Physical activity:     Days per week: Not on file     Minutes per session: Not on file    Stress: Not on file   Relationships    Social connections:     Talks on phone: Not on file     Gets together: Not on file     Attends [employer] service: Not on file     Active member of club or organization: Not on file     Attends meetings of clubs or organizations: Not on file     Relationship status: Not on file    Intimate partner violence:     Fear of current or ex partner: Not on file     Emotionally abused: Not on file     Physically abused: Not on file     Forced sexual activity: Not on file   Other Topics Concern    Not on file   Social History Narrative    Not on file     Family History   Problem Relation Age of Onset    Cancer Mother        REVIEW OF SYSTEMS:     General/Constitution:  (-)weight loss, (-)fever, (-)chills, (-)weakness. Skin: (-) rash,(-) psoriasis,(-) eczema, (-)skin cancer. Musculoskeletal: (-) fractures,  (-) dislocations,(-) collagen vascular disease, (-) fibromyalgia, (-) multiple sclerosis, (-) muscular dystrophy, (-) RSD,(-) joint pain (-)swelling, (-) joint pain,swelling.   Neurologic: (-) epilepsy, (-)seizures,(-) brain tumor,(-) TIA, (-)stroke, (-)headaches, (-)Parkinson disease,(-) memory loss, (-) are unremarkable. Further interval healing changes of a distal fibula fracture without evidence of hardware complication. Ct Ankle Left Wo Contrast    Result Date: 2/24/2020  Reading location: 200 INDICATION: Fracture, follow-up FINDINGS: Axial CT images through the left ankle with computer reconstruction images in sagittal coronal planes. Comparison with x-ray 2/21/2020. Automated dose control. Lateral screw plate distal fibula produces beam hardening artifact. Healing callus formation at the fracture site distal fibula. Nonunited fracture fragments are noted along the distal aspect of the fracture plane anteromedial at the syndesmosis. There is healing callus formation. On the sagittal reconstruction images, residual nonunited fracture plane is noted alignment at the tibiotalar and subtalar joints appears intact. No bony union at distal fibula fracture. Small fracture fragments along the anterior and inferior aspect of the fracture plane at the syndesmosis. Radiographic findings reviewed with patient      Impression:  Janet Martinez was seen today for ankle pain. Diagnoses and all orders for this visit:    Status post open reduction with internal fixation (ORIF) of fracture of ankle      Patient seen and examined. X-rays reviewed. Natural history and course discussed with patient. Treatment options discussed with patient in detail including risks and benefits. CT scan to evaluate for bone healing for possible removal of hardware. CT reviewed with patient today showing incomplete healing at this time. Home exercise plan outlined. OTC analgesics as needed. At this status post 3 months including ORIF, patient is now not completely healed per CT scan. Bone stimulator is therefore recommended. Patient should remain off work and provided work note for this today due to continued pain and fracture healing.   Patient and family were instructed on wound care education and to watch for signs of

## 2020-04-29 ENCOUNTER — OFFICE VISIT (OUTPATIENT)
Dept: ORTHOPEDIC SURGERY | Age: 60
End: 2020-04-29
Payer: COMMERCIAL

## 2020-04-29 VITALS — BODY MASS INDEX: 22.62 KG/M2 | WEIGHT: 158 LBS | HEIGHT: 70 IN

## 2020-04-29 PROCEDURE — 99214 OFFICE O/P EST MOD 30 MIN: CPT | Performed by: ORTHOPAEDIC SURGERY

## 2020-04-29 NOTE — PROGRESS NOTES
filed for this visit. Psycihatric:    The patient is alert and oriented x 3, appears to be stated age and in no distress. Respiratory:    Respiratory effort is not labored. Patient is not gasping. Palpation of the chest reveals no tactile fremitus. Skin:    Upon inspection: the skin appears warm, dry and intact. There is not a previous scar over the affected area. There is not any cellulitis, lymphedema or cutaneous lesions noted in the lower extremities. Upon palpation there is no induration noted. Neurologic:      Motor exam of the lower extremities show ; quadriceps, hamstrings, foot dorsi and plantar flexors intact R.  5/5 and L. 5/5. Deep tendon reflexes are 2/4 at the knees and 2/4 at the ankles with strong extensor hallicus longus motor strength bilaterally. Sensory to both feet is intact to all sensory roots. Cardiovascular: The vascular exam is normal and is well perfused to distal extremities. Distal pulses DP/PT: R. 2+; L. 2+. There is cap refill noted less than two seconds in all digits. There is not edema of the bilateral lower extremities. There is not varicosities noted in the distal extremities. Lymph:    Upon palpation,  there is no lymphadenopathy noted in bilateral lower extremities. Musculoskeletal:    Gait: antalgic; examination of the nails and digits reveal no cyanosis or clubbing. Knee exam - bilateral knee exam shows;  range of motion of R. Knee is 0 to 140, and L. Knee is 0 to 140. The patient does not have  pain on motion, there is not an effusion, there is not tenderness over the  global region, there are not any masses, there is not ligamentous instability, there is not  deformity noted. Knee exam: knee reveals normal exam, no swelling, tenderness, instability; ligaments intact, FROM.      Ankle Exam:    Upon inspection and palpation of the Left ankle,  there is not deformity noted,  No swelling, moderate ecchymosis, has pain on palpation

## 2022-10-03 ENCOUNTER — PROCEDURE VISIT (OUTPATIENT)
Dept: AUDIOLOGY | Age: 62
End: 2022-10-03
Payer: MEDICARE

## 2022-10-03 ENCOUNTER — OFFICE VISIT (OUTPATIENT)
Dept: ENT CLINIC | Age: 62
End: 2022-10-03
Payer: MEDICARE

## 2022-10-03 VITALS
BODY MASS INDEX: 22.67 KG/M2 | SYSTOLIC BLOOD PRESSURE: 138 MMHG | DIASTOLIC BLOOD PRESSURE: 85 MMHG | WEIGHT: 158 LBS | HEART RATE: 84 BPM

## 2022-10-03 DIAGNOSIS — H69.82 DYSFUNCTION OF LEFT EUSTACHIAN TUBE: Primary | ICD-10-CM

## 2022-10-03 DIAGNOSIS — H71.02 CHOLESTEATOMA OF ATTIC OF EAR, LEFT: ICD-10-CM

## 2022-10-03 DIAGNOSIS — H69.82 ETD (EUSTACHIAN TUBE DYSFUNCTION), LEFT: Primary | ICD-10-CM

## 2022-10-03 PROCEDURE — 99204 OFFICE O/P NEW MOD 45 MIN: CPT | Performed by: OTOLARYNGOLOGY

## 2022-10-03 PROCEDURE — 92567 TYMPANOMETRY: CPT | Performed by: AUDIOLOGIST

## 2022-10-03 RX ORDER — COLCHICINE 0.6 MG/1
TABLET ORAL
COMMUNITY
Start: 2022-08-07

## 2022-10-03 RX ORDER — LISINOPRIL 5 MG/1
TABLET ORAL
COMMUNITY
Start: 2022-09-24

## 2022-10-03 RX ORDER — OMEPRAZOLE 20 MG/1
20 CAPSULE, DELAYED RELEASE ORAL DAILY
COMMUNITY

## 2022-10-03 RX ORDER — OFLOXACIN 3 MG/ML
3 SOLUTION/ DROPS OPHTHALMIC 2 TIMES DAILY
COMMUNITY

## 2022-10-03 NOTE — PROGRESS NOTES
This patient was referred for tympanometric testing by Dr. Naomi Youssef due to ear drainage, left ear. Patient stated he was seen at the [address] [place] [address], in May and a hearing evaluation was performed and patient stated he was to be scheduled for surgery. Patient reported he has not had surgery. Tympanometry revealed normal middle ear peak pressure and compliance, right ear and negative middle ear peak pressure and reduced compliance, left ear. The results were reviewed with the patient. Recommendations for follow up will be made pending physician consult.     Kelly Maldonado CCC/CHARLETTE  Audiologist  [id number]  NPI#:  [id number]      Electronically signed by Shorty Mccarty on 10/3/2022 at 2:15 PM

## 2022-10-03 NOTE — PROGRESS NOTES
[facility]  Dr. Luca Tariq. AYSE Jenkins Ms.Ed. New Consult       Patient Name:  Eddie Rios  :  1960     CHIEF C/O:    Chief Complaint   Patient presents with    New Patient     Left ear hearing loss       HISTORY OBTAINED FROM:  patient    HISTORY OF PRESENT ILLNESS:       Danelle Hua is a 64y.o. year old male, here today for seen for evaluation of left-sided ear fullness pressure and hearing loss. Previous patient of the lipid group, as treated the patient for chronic otitis media with effusion and drainage, is here with continued hearing loss ear fullness pressure intermittent episodes of ear drainage most recently approximately 1 week ago patient was placed on antibiotic drops and now has stopped the drops with no significant new ear drainage but continues have ear fullness pressure. No complaints of active room spinning vertigo. No right ear complaints no right ear drainage no complaints of epistaxis fever chills or soreness. No complaints headaches or vision changes. Past Medical History:   Diagnosis Date    Fractures     right anklex2    Gout      Past Surgical History:   Procedure Laterality Date    ANKLE FRACTURE SURGERY Left 2019    LEFT ANKLE OPEN REDUCTION INTERNAL FIXATION (SYNTHES) performed by Catherene Riedel, DO at [address]         Current Outpatient Medications:     omeprazole (PRILOSEC) 20 MG delayed release capsule, Take 20 mg by mouth daily, Disp: , Rfl:     ofloxacin (OCUFLOX) 0.3 % solution, 3 drops in the morning and at bedtime, Disp: , Rfl:     colchicine (COLCRYS) 0.6 MG tablet, take 1 tablet by mouth twice a day, Disp: , Rfl:     lisinopril (PRINIVIL;ZESTRIL) 5 MG tablet, take 1 tablet by mouth once daily, Disp: , Rfl:   Patient has no known allergies.   Social History     Tobacco Use    Smoking status: Former     Types: Cigarettes    Smokeless tobacco: Former     Types: Snuff   Vaping Use    Vaping Use: Never used   Substance Use Topics Alcohol use: Yes     Alcohol/week: 6.0 standard drinks     Types: 6 Cans of beer per week     Comment: daily 6 beers a day    Drug use: Never     Family History   Problem Relation Age of Onset    Cancer Mother        Review of Systems   Constitutional:  Negative for chills and fever. HENT:  Positive for hearing loss. Negative for ear discharge, nosebleeds, rhinorrhea, sinus pressure, sinus pain, sneezing and sore throat. Respiratory:  Negative for cough and shortness of breath. Cardiovascular:  Negative for chest pain and palpitations. Gastrointestinal:  Negative for vomiting. Skin:  Negative for rash. Allergic/Immunologic: Negative for environmental allergies. Neurological:  Negative for dizziness and headaches. Hematological:  Does not bruise/bleed easily. All other systems reviewed and are negative. /85 (Site: Left Upper Arm, Position: Sitting, Cuff Size: Large Adult)   Pulse 84   Wt 158 lb (71.7 kg)   BMI 22.67 kg/m²   Physical Exam  Vitals and nursing note reviewed. Constitutional:       Appearance: He is well-developed. HENT:      Head: Normocephalic. Right Ear: Decreased hearing noted. Left Ear: Decreased hearing noted. Tympanic membrane is retracted and bulging. Tympanic membrane has decreased mobility. Ears:        Mouth/Throat:      Lips: Pink. Tongue: No lesions. Tongue does not deviate from midline. Palate: No mass and lesions. Pharynx: No pharyngeal swelling, oropharyngeal exudate, posterior oropharyngeal erythema or uvula swelling. Tonsils: No tonsillar exudate or tonsillar abscesses. Eyes:      Conjunctiva/sclera: Conjunctivae normal.      Pupils: Pupils are equal, round, and reactive to light. Cardiovascular:      Rate and Rhythm: Normal rate. Pulmonary:      Effort: Pulmonary effort is normal. No respiratory distress. Breath sounds: Normal breath sounds. Abdominal:      General: There is no distension. Tenderness: There is no abdominal tenderness. There is no guarding. Musculoskeletal:         General: Normal range of motion. Cervical back: Normal range of motion and neck supple. Skin:     General: Skin is warm and dry. Neurological:      Mental Status: He is alert and oriented to person, place, and time. Psychiatric:         Behavior: Behavior normal.         Thought Content: Thought content normal.         Judgment: Judgment normal.         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 Recommendations are for the patient undergo CT scan of the IAC, consider evaluation by otology for surgical intervention and reconstruction. Dr. Kat Joseph Otolaryngology/Facial Plastic Surgery Residency  Associate Clinical Professor:  Vazquez Nolasco, [facility]

## 2022-10-07 ENCOUNTER — TELEPHONE (OUTPATIENT)
Dept: ENT CLINIC | Age: 62
End: 2022-10-07

## 2022-10-07 ASSESSMENT — ENCOUNTER SYMPTOMS
VOMITING: 0
RHINORRHEA: 0
SHORTNESS OF BREATH: 0
COUGH: 0
SINUS PRESSURE: 0
SORE THROAT: 0
SINUS PAIN: 0

## 2022-10-12 ENCOUNTER — TELEPHONE (OUTPATIENT)
Dept: ENT CLINIC | Age: 62
End: 2022-10-12

## 2022-10-12 NOTE — TELEPHONE ENCOUNTER
----- Message from Chaka Okeefe DO sent at 10/11/2022  3:27 PM EDT -----  Need to inform PCP of possible metastatic disease that will need worked up per the CT findings

## 2022-10-21 NOTE — TELEPHONE ENCOUNTER
Kathya Butler from Dr Anali Desir office called and stated that she did not understand the message please call her back at [phone] only there til 1pm today

## 2022-11-03 NOTE — TELEPHONE ENCOUNTER
Called and spoke with Susi Patle, advising per Dr Jonas Ramirez patient needs worked up for a possible metastatic disease that was found on his CT scan. . CT and LOV faxed to Dr Whitley Hillman office. Susi Patel will give to provider and contact patient.

## 2022-11-08 ENCOUNTER — OFFICE VISIT (OUTPATIENT)
Dept: ENT CLINIC | Age: 62
End: 2022-11-08
Payer: MEDICARE

## 2022-11-08 VITALS
WEIGHT: 192 LBS | DIASTOLIC BLOOD PRESSURE: 90 MMHG | HEART RATE: 88 BPM | BODY MASS INDEX: 27.49 KG/M2 | HEIGHT: 70 IN | SYSTOLIC BLOOD PRESSURE: 145 MMHG

## 2022-11-08 DIAGNOSIS — H71.92 CHOLESTEATOMA OF LEFT EAR: ICD-10-CM

## 2022-11-08 DIAGNOSIS — G93.9 TEMPORAL LOBE LESION: ICD-10-CM

## 2022-11-08 DIAGNOSIS — Z01.812 PRE-PROCEDURAL LABORATORY EXAMINATIONS: Primary | ICD-10-CM

## 2022-11-08 PROCEDURE — 99213 OFFICE O/P EST LOW 20 MIN: CPT | Performed by: OTOLARYNGOLOGY

## 2022-11-08 ASSESSMENT — ENCOUNTER SYMPTOMS
SINUS PAIN: 0
SHORTNESS OF BREATH: 0
SORE THROAT: 0
RHINORRHEA: 0
VOMITING: 0
SINUS PRESSURE: 0
COUGH: 0

## 2022-11-08 NOTE — PROGRESS NOTES
[address] [facility] Otolaryngology  Dr. Mickey Fitzpatrick. AYSE Jenkins Ms.Ed. New Consult       Patient Name:  Shayna Weeks  :  1960     CHIEF C/O:    Chief Complaint   Patient presents with    Follow-up     CT results       HISTORY OBTAINED FROM:  patient    HISTORY OF PRESENT ILLNESS:       Ella Espinoza is a 64y.o. year old male, here today for seen for evaluation of left-sided ear fullness pressure and hearing loss. Previous patient of the lipid group, as treated the patient for chronic otitis media with effusion and drainage, is here with continued hearing loss ear fullness pressure intermittent episodes of ear drainage most recently approximately 1 week ago patient was placed on antibiotic drops and now has stopped the drops with no significant new ear drainage but continues have ear fullness pressure. No complaints of active room spinning vertigo. No right ear complaints no right ear drainage no complaints of epistaxis fever chills or soreness. No complaints headaches or vision changes. Past Medical History:   Diagnosis Date    Fractures     right anklex2    Gout      Past Surgical History:   Procedure Laterality Date    ANKLE FRACTURE SURGERY Left 2019    LEFT ANKLE OPEN REDUCTION INTERNAL FIXATION (SYNTHES) performed by Celsa Moore DO at . [facility]         Current Outpatient Medications:     omeprazole (PRILOSEC) 20 MG delayed release capsule, Take 20 mg by mouth daily, Disp: , Rfl:     ofloxacin (OCUFLOX) 0.3 % solution, 3 drops in the morning and at bedtime, Disp: , Rfl:     colchicine (COLCRYS) 0.6 MG tablet, take 1 tablet by mouth twice a day, Disp: , Rfl:     lisinopril (PRINIVIL;ZESTRIL) 5 MG tablet, take 1 tablet by mouth once daily, Disp: , Rfl:   Patient has no known allergies.   Social History     Tobacco Use    Smoking status: Former     Types: Cigarettes    Smokeless tobacco: Former     Types: Snuff   Vaping Use    Vaping Use: Never used   Substance Use Topics    Alcohol use: Yes     Alcohol/week: 6.0 standard drinks     Types: 6 Cans of beer per week     Comment: daily 6 beers a day    Drug use: Never     Family History   Problem Relation Age of Onset    Cancer Mother        Review of Systems   Constitutional:  Negative for chills and fever. HENT:  Positive for hearing loss. Negative for ear discharge, nosebleeds, rhinorrhea, sinus pressure, sinus pain, sneezing and sore throat. Respiratory:  Negative for cough and shortness of breath. Cardiovascular:  Negative for chest pain and palpitations. Gastrointestinal:  Negative for vomiting. Skin:  Negative for rash. Allergic/Immunologic: Negative for environmental allergies. Neurological:  Negative for dizziness and headaches. Hematological:  Does not bruise/bleed easily. All other systems reviewed and are negative. BP (!) 145/90 (Site: Left Upper Arm, Position: Sitting, Cuff Size: Large Adult)   Pulse 88   Ht 5' 10\" (1.778 m)   Wt 192 lb (87.1 kg)   BMI 27.55 kg/m²   Physical Exam  Vitals and nursing note reviewed. Constitutional:       Appearance: He is well-developed. HENT:      Head: Normocephalic. Right Ear: Decreased hearing noted. Left Ear: Decreased hearing noted. Tympanic membrane is retracted and bulging. Tympanic membrane has decreased mobility. Ears:        Mouth/Throat:      Lips: Pink. Tongue: No lesions. Tongue does not deviate from midline. Palate: No mass and lesions. Pharynx: No pharyngeal swelling, oropharyngeal exudate, posterior oropharyngeal erythema or uvula swelling. Tonsils: No tonsillar exudate or tonsillar abscesses. Eyes:      Conjunctiva/sclera: Conjunctivae normal.      Pupils: Pupils are equal, round, and reactive to light. Cardiovascular:      Rate and Rhythm: Normal rate. Pulmonary:      Effort: Pulmonary effort is normal. No respiratory distress. Breath sounds: Normal breath sounds.    Abdominal:      General: There is no distension. Tenderness: There is no abdominal tenderness. There is no guarding. Musculoskeletal:         General: Normal range of motion. Cervical back: Normal range of motion and neck supple. Skin:     General: Skin is warm and dry. Neurological:      Mental Status: He is alert and oriented to person, place, and time. Psychiatric:         Behavior: Behavior normal.         Thought Content: Thought content normal.         Judgment: Judgment normal.         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 Recommendations are for the patient undergo CT scan of the IAC, consider evaluation by otology for surgical intervention and reconstruction. Secondarily, CT scan did show incidental findings for concern for possible metastatic disease to the temporal bone based on radiographic findings they had recommended several imaging studies which I will initiate including chest CT, S and abdomen, I would defer bone scan to primary care physician I did reach out to his primary care physician and spoke with him about these concerning results and that the need for close follow-up for these issues that are not related to otolaryngology. Dr. Vertie Hero D. Bunevich D.O. Ms. Gerold Gitelman.  Otolaryngology Facial Plastic Surgery  :[facility] Otolaryngology/Facial Plastic Surgery Residency  Associate Clinical Professor:  Tanner Li, [facility]  1960      I have discussed the case, including pertinent history and exam findings with the resident. I have seen and examined the patient and the key elements of the encounter have been performed by me. I agree with the assessment, plan and orders as documented by the resident. Patient here for follow up of medical problems.          Remainder of medical problems as per resident note.      Americo Vargas DO  12/1/22

## 2022-11-19 NOTE — PROGRESS NOTES
NEW PATIENT VISIT  Chief Complaint   Patient presents with    Ear Problem     Left cholesteatoma referred by Dr. Pedro Marcano      History of Present Illness:     Kingston Alfaro is a 64 y.o. male presenting with left ear disease; s/p CT scan (noted small attic thickening, no bony destruction and some concern for incidental finding of metastatic disease to the temporal bones; patient of Dr. Elenita Tolentino obtained from the [facility] [address] [place] [address] with bilateral hearing loss L>>R  He reports a history of ear issues since he was age 9. He was a smoker and quit about 10 years ago. No weight loss, does have some balance issues and broke his leg and retired. Some communication can be challenging; he states his left ear can feel squishy and can have intermittent drainage, occurring once a year to every few years.  Today since treatment with drops, ear has been feeling better          TOBACCO  Social History     Tobacco Use   Smoking Status Former    Types: Cigarettes   Smokeless Tobacco Former    Types: Snuff        ALCOHOL   Social History     Substance and Sexual Activity   Alcohol Use Yes    Alcohol/week: 6.0 standard drinks    Types: 6 Cans of beer per week    Comment: daily 6 beers a day        [address]   Social History     Substance and Sexual Activity   Drug Use Never        CURRENT OUTPATIENT MEDICATIONS:   Outpatient Medications Marked as Taking for the 11/21/22 encounter (Office Visit) with Kristine Flores MD   Medication Sig Dispense Refill    omeprazole (PRILOSEC) 20 MG delayed release capsule Take 20 mg by mouth daily      ofloxacin (OCUFLOX) 0.3 % solution 3 drops in the morning and at bedtime      colchicine (COLCRYS) 0.6 MG tablet take 1 tablet by mouth twice a day      lisinopril (PRINIVIL;ZESTRIL) 5 MG tablet take 1 tablet by mouth once daily          ALLERGIES:   No Known Allergies    Timing Age of Onset unclear   Duration Increasing in Severity No   Days of work missed in last year n/a      Modifying Factors Seasonal variation No        Associated Symptoms Mouth breathing No    Communication concerns Yes    Halitosis No     Family History Family members with similar conditions father with hearing loss   Family history of bleeding concerns No   Family history of anesthia concerns No        Review of Symptoms:  I have reviewed the patient's medical history in detail; there are no changes to the history as noted in the electronic medical record. BP (!) 135/96 (Site: Left Upper Arm, Position: Sitting, Cuff Size: Large Adult)   Pulse (!) 111   Temp 97 °F (36.1 °C) (Temporal)   Ht 5' 10\" (1.778 m)   Wt 192 lb (87.1 kg)   SpO2 97%   BMI 27.55 kg/m²     Physical Exam    Allergies No Known Allergies   Constitutional Retractions/cyanosis {No     Head and Face   Lesions or masses No  facies symmetrical Yes   Eyes Ocular motion with gaze alignment Yes   Ears Inspection: Scars, lesions or masses No   Otoscopy  EAC patent bilaterally without occlusion External ears normal. Canals clear. TM right normal. Left TM intact but noted to have some thickening anterior to the malleus      Nasal Inspection    No external Scars, lesions or masses    Pyriform apertures widely patent    Nasal musosa healthy   Septum Midline, no Septal Perforation, no septal hematoma   Turbinates Intact, healthy   Oral Cavity Lips no lesions    Teeth healthy without cavities    Gums no lesions    Oral mucosa healthy    Hard and Soft Palate no lesions    Uvula single fid    Tongue no lesions    Tonsils normal size Symmetric without exudate   Neck . Neck supple without tenderness or crepitus   Lymph  Cranial Nerve exam No palpable adenopathy  Grossly intact. CN VII symmetrical   Respiration Symmetric without Increased work of breathing    Cardiovacular No Cyanosis    Skin healthy   Diagnostics    Test ordered No orders of the defined types were placed in this encounter.      Review of existing tests Lab Results   Component Value Date/Time    WBC 3.3 (L) 05/06/2017 09:54 AM    HGB 14.8 05/06/2017 09:54 AM    HCT 42.8 05/06/2017 09:54 AM     05/06/2017 09:54 AM    MCV 93.9 05/06/2017 09:54 AM    MCH 32.5 05/06/2017 09:54 AM    MCHC 34.6 (H) 05/06/2017 09:54 AM    RDW 12.5 05/06/2017 09:54 AM    NEUTOPHILPCT 46.6 05/06/2017 09:54 AM    LYMPHOPCT 40.2 05/06/2017 09:54 AM    MONOPCT 9.0 05/06/2017 09:54 AM    EOSRELPCT 2.7 05/06/2017 09:54 AM    BASOPCT 0.9 05/06/2017 09:54 AM    NEUTROABS 1.55 (L) 05/06/2017 09:54 AM    LYMPHSABS 1.34 (L) 05/06/2017 09:54 AM    EOSABS 0.09 05/06/2017 09:54 AM        Old records  Reviewed   Discussion with other providers    Done     On this date 11/21/2022 I have spent 10 minutes reviewing previous notes, test results and 30 min face to face with the patient discussing the diagnosis and importance of compliance with the treatment plan as well as documenting on the day of the visit. A/P    Impression / Plan:     Maikel Hunt is a 64 y.o.  male with left chronic draining ear and bilateral hearing loss L>R confirmed by audiogram and examination, who will benefit from brief observation.  The rest of the scan was noted to have incidental findings concerning for mets on his temporal bone scan      Patient will benefit from proceeding with prearranged scans   Follow up in 3 months to reassess ears  Discussed bilateral hearing aids  Old records were reviewed     Naman Doherty MD 11/19/22 3:09 PM EST   Director Otology and Cochlear Implant Programs

## 2022-11-21 ENCOUNTER — OFFICE VISIT (OUTPATIENT)
Dept: ENT CLINIC | Age: 62
End: 2022-11-21
Payer: MEDICARE

## 2022-11-21 VITALS
BODY MASS INDEX: 27.49 KG/M2 | TEMPERATURE: 97 F | DIASTOLIC BLOOD PRESSURE: 96 MMHG | WEIGHT: 192 LBS | HEART RATE: 111 BPM | OXYGEN SATURATION: 97 % | HEIGHT: 70 IN | SYSTOLIC BLOOD PRESSURE: 135 MMHG

## 2022-11-21 DIAGNOSIS — G93.9 TEMPORAL LOBE LESION: ICD-10-CM

## 2022-11-21 DIAGNOSIS — H71.92 CHOLESTEATOMA OF LEFT EAR: ICD-10-CM

## 2022-11-21 DIAGNOSIS — H69.83 EUSTACHIAN TUBE DYSFUNCTION, BILATERAL: ICD-10-CM

## 2022-11-21 DIAGNOSIS — H90.3 BILATERAL SENSORINEURAL HEARING LOSS: Primary | ICD-10-CM

## 2022-11-21 PROCEDURE — 99215 OFFICE O/P EST HI 40 MIN: CPT | Performed by: OTOLARYNGOLOGY

## 2022-11-28 ENCOUNTER — APPOINTMENT (OUTPATIENT)
Dept: CT IMAGING | Age: 62
End: 2022-11-28
Payer: MEDICARE

## 2022-11-28 ENCOUNTER — HOSPITAL ENCOUNTER (OUTPATIENT)
Dept: CT IMAGING | Age: 62
Discharge: HOME | End: 2022-11-28
Payer: MEDICARE

## 2022-11-28 DIAGNOSIS — G93.9 TEMPORAL LOBE LESION: ICD-10-CM

## 2022-11-28 PROCEDURE — 6360000004 HC RX CONTRAST MEDICATION: Performed by: RADIOLOGY

## 2022-11-28 PROCEDURE — 71260 CT THORAX DX C+: CPT

## 2022-11-28 PROCEDURE — 74177 CT ABD & PELVIS W/CONTRAST: CPT

## 2022-11-28 RX ADMIN — IOPAMIDOL 75 ML: 755 INJECTION, SOLUTION INTRAVENOUS at 08:57

## 2022-11-28 RX ADMIN — IOPAMIDOL 50 ML: 612 INJECTION, SOLUTION INTRAVENOUS at 08:57

## 2023-02-26 NOTE — PROGRESS NOTES
CC:   Chief Complaint   Patient presents with    Follow-up     3 month left cholesteatoma      Urvashi Burton is a 58 y.o. male is returning for 3 mo follow up; presented with left ear disease; s/p CT scan (noted small attic thickening, no bony destruction and some concern for incidental finding of metastatic disease to the temporal bones; patient of Dr. Jag rPuitt; recently been having trouble walking  Audiogram obtained from the [facility] with bilateral hearing loss L>>R  He reports a history of ear issues since he was age 9. He was a smoker and quit about 10 years ago. No weight loss, does have some balance issues and broke his leg and retired. Some communication can be challenging; he states his left ear can feel squishy and can have intermittent drainage, occurring once a year to every few years. CT chest and abdomen read by radiology as no evidence of malignancy. Discussed hearing aids, they are to check with their insurance; last audio 11/2022             PAST MEDICAL HISTORY:   Past Medical History:   Diagnosis Date    Fractures     right anklex2    Gout         PAST SURGICAL HISTORY:   Past Surgical History:   Procedure Laterality Date    ANKLE FRACTURE SURGERY Left 11/18/2019    LEFT ANKLE OPEN REDUCTION INTERNAL FIXATION (SYNTHES) performed by Margarette Olivier DO at [address] [facility] HISTORY:   Social History     Socioeconomic History    Marital status:      Spouse name: Not on file    Number of children: Not on file    Years of education: Not on file    Highest education level: Not on file   Occupational History    Not on file   Tobacco Use    Smoking status: Former     Types: Cigarettes    Smokeless tobacco: Former     Types: Snuff   Vaping Use    Vaping Use: Never used   Substance and Sexual Activity    Alcohol use:  Yes     Alcohol/week: 6.0 standard drinks     Types: 6 Cans of beer per week     Comment: daily 6 beers a day    Drug use: Never    Sexual activity: Not on file   Other Topics Concern    Not on file   Social History Narrative    Not on file     Social Determinants of Health     Financial Resource Strain: Not on file   Food Insecurity: Not on file   Transportation Needs: Not on file   Physical Activity: Not on file   Stress: Not on file   Social Connections: Not on file   Intimate Partner Violence: Not on file   Housing Stability: Not on file        TOBACCO  Social History     Tobacco Use   Smoking Status Former    Types: Cigarettes   Smokeless Tobacco Former    Types: Snuff        ALCOHOL   Social History     Substance and Sexual Activity   Alcohol Use Yes    Alcohol/week: 6.0 standard drinks    Types: 6 Cans of beer per week    Comment: daily 6 beers a day        [address]   Social History     Substance and Sexual Activity   Drug Use Never         CURRENT OUTPATIENT MEDICATIONS:   Outpatient Medications Marked as Taking for the 2/27/23 encounter (Office Visit) with Kasey Duncan MD   Medication Sig Dispense Refill    omeprazole (PRILOSEC) 20 MG delayed release capsule Take 20 mg by mouth daily      ofloxacin (OCUFLOX) 0.3 % solution 3 drops in the morning and at bedtime      colchicine (COLCRYS) 0.6 MG tablet take 1 tablet by mouth twice a day      lisinopril (PRINIVIL;ZESTRIL) 5 MG tablet take 1 tablet by mouth once daily          ALLERGIES:   No Known Allergies    ROS: I have reviewed the patient's medical history in detail; there are no changes to the history as noted in the electronic medical record. Exam: /83 (Site: Left Upper Arm, Position: Sitting, Cuff Size: Large Adult)   Pulse (!) 108   Temp 97.6 °F (36.4 °C) (Temporal)   Ht 5' 10\" (1.778 m)   Wt 200 lb (90.7 kg)   SpO2 95%   BMI 28.70 kg/m²   Urvashi Burton is a 58 y.o. male  appears well, in no apparent distress. Alert and oriented times three, pleasant and cooperative. Vital signs are as documented in vital signs section.   Breathing comfortably, without stertor or stridor  Ear exam: External ears normal. Canals clear. TM right normal. Left TM with small area of possible granulation, landmarks not clearly visible  No nasal lesions, no erythema   No oral lesions. There is no palpable adenopathy or tenderness    Lab Results   Component Value Date/Time    WBC 3.3 (L) 05/06/2017 09:54 AM    HGB 14.8 05/06/2017 09:54 AM    HCT 42.8 05/06/2017 09:54 AM     05/06/2017 09:54 AM    MCV 93.9 05/06/2017 09:54 AM    MCH 32.5 05/06/2017 09:54 AM    MCHC 34.6 (H) 05/06/2017 09:54 AM    RDW 12.5 05/06/2017 09:54 AM    NEUTOPHILPCT 46.6 05/06/2017 09:54 AM    LYMPHOPCT 40.2 05/06/2017 09:54 AM    MONOPCT 9.0 05/06/2017 09:54 AM    EOSRELPCT 2.7 05/06/2017 09:54 AM    BASOPCT 0.9 05/06/2017 09:54 AM    NEUTROABS 1.55 (L) 05/06/2017 09:54 AM    LYMPHSABS 1.34 (L) 05/06/2017 09:54 AM    EOSABS 0.09 05/06/2017 09:54 AM       On this date 2/27/2023 I have spent 10 minutes reviewing previous notes, test results and 30 min face to face with the patient discussing the diagnosis and importance of compliance with the treatment plan as well as documenting on the day of the visit.     A/P:     Abraham Badillo is a 58 y.o. male with concern for left ear cholesteatoma and asymmetric hearing loss  MRI IAC  Hearing aids discussed  Follow up after MRI and will likely need ear exploration left  Some information about neurosurgeons given to patient for PCP  Plan to be finalized based on imaging    Enrrique Littlejohn MD 2/26/23 12:45 PM EST  Director Otology and Cochlear Implant Programs

## 2023-02-27 ENCOUNTER — OFFICE VISIT (OUTPATIENT)
Dept: ENT CLINIC | Age: 63
End: 2023-02-27
Payer: MEDICARE

## 2023-02-27 VITALS
TEMPERATURE: 97.6 F | BODY MASS INDEX: 28.63 KG/M2 | OXYGEN SATURATION: 95 % | WEIGHT: 200 LBS | DIASTOLIC BLOOD PRESSURE: 83 MMHG | HEART RATE: 108 BPM | HEIGHT: 70 IN | SYSTOLIC BLOOD PRESSURE: 130 MMHG

## 2023-02-27 DIAGNOSIS — H71.92 CHOLESTEATOMA OF LEFT EAR: ICD-10-CM

## 2023-02-27 DIAGNOSIS — H91.8X9 ASYMMETRICAL HEARING LOSS: ICD-10-CM

## 2023-02-27 DIAGNOSIS — H69.83 EUSTACHIAN TUBE DYSFUNCTION, BILATERAL: ICD-10-CM

## 2023-02-27 DIAGNOSIS — H90.3 BILATERAL SENSORINEURAL HEARING LOSS: Primary | ICD-10-CM

## 2023-02-27 PROCEDURE — 99215 OFFICE O/P EST HI 40 MIN: CPT | Performed by: OTOLARYNGOLOGY

## 2023-03-07 ENCOUNTER — HOSPITAL ENCOUNTER (OUTPATIENT)
Age: 63
Discharge: HOME | End: 2023-03-07
Payer: MEDICARE

## 2023-03-07 ENCOUNTER — HOSPITAL ENCOUNTER (OUTPATIENT)
Dept: MRI IMAGING | Age: 63
Discharge: HOME | End: 2023-03-09
Payer: MEDICARE

## 2023-03-07 DIAGNOSIS — H90.3 BILATERAL SENSORINEURAL HEARING LOSS: ICD-10-CM

## 2023-03-07 DIAGNOSIS — H91.8X9 ASYMMETRICAL HEARING LOSS: ICD-10-CM

## 2023-03-07 LAB
BUN BLDV-MCNC: 12 MG/DL (ref 6–23)
CREAT SERPL-MCNC: 0.9 MG/DL (ref 0.7–1.2)
GFR SERPL CREATININE-BSD FRML MDRD: >60 ML/MIN/1.73

## 2023-03-07 PROCEDURE — A9577 INJ MULTIHANCE: HCPCS | Performed by: RADIOLOGY

## 2023-03-07 PROCEDURE — 70553 MRI BRAIN STEM W/O & W/DYE: CPT

## 2023-03-07 PROCEDURE — 36415 COLL VENOUS BLD VENIPUNCTURE: CPT

## 2023-03-07 PROCEDURE — 82565 ASSAY OF CREATININE: CPT

## 2023-03-07 PROCEDURE — 6360000004 HC RX CONTRAST MEDICATION: Performed by: RADIOLOGY

## 2023-03-07 PROCEDURE — 84520 ASSAY OF UREA NITROGEN: CPT

## 2023-03-07 RX ADMIN — GADOBENATE DIMEGLUMINE 20 ML: 529 INJECTION, SOLUTION INTRAVENOUS at 08:22

## 2023-03-11 RX ORDER — CIPROFLOXACIN AND DEXAMETHASONE 3; 1 MG/ML; MG/ML
SUSPENSION/ DROPS AURICULAR (OTIC)
Qty: 1 EACH | Refills: 0 | Status: SHIPPED | OUTPATIENT
Start: 2023-03-11 | End: 2023-03-18

## 2023-04-17 ENCOUNTER — OFFICE VISIT (OUTPATIENT)
Dept: ENT CLINIC | Age: 63
End: 2023-04-17
Payer: MEDICARE

## 2023-04-17 VITALS
HEART RATE: 81 BPM | HEIGHT: 70 IN | OXYGEN SATURATION: 93 % | TEMPERATURE: 97.1 F | SYSTOLIC BLOOD PRESSURE: 156 MMHG | BODY MASS INDEX: 28.63 KG/M2 | WEIGHT: 200 LBS | DIASTOLIC BLOOD PRESSURE: 84 MMHG

## 2023-04-17 DIAGNOSIS — H90.3 BILATERAL SENSORINEURAL HEARING LOSS: Primary | ICD-10-CM

## 2023-04-17 DIAGNOSIS — H91.8X9 ASYMMETRICAL HEARING LOSS: ICD-10-CM

## 2023-04-17 DIAGNOSIS — H71.92 CHOLESTEATOMA OF LEFT EAR: ICD-10-CM

## 2023-04-17 DIAGNOSIS — H69.83 EUSTACHIAN TUBE DYSFUNCTION, BILATERAL: ICD-10-CM

## 2023-04-17 PROCEDURE — 99214 OFFICE O/P EST MOD 30 MIN: CPT | Performed by: OTOLARYNGOLOGY

## 2023-04-18 ENCOUNTER — TELEPHONE (OUTPATIENT)
Dept: ENT CLINIC | Age: 63
End: 2023-04-18

## 2023-04-18 PROBLEM — H71.92 CHOLESTEATOMA OF LEFT EAR: Status: ACTIVE | Noted: 2023-04-18

## 2023-04-18 NOTE — TELEPHONE ENCOUNTER
Prior Authorization Form:      DEMOGRAPHICS:                     Patient Name:  Francheska Joel  Patient :  1960            Insurance:  Payor: [facility] MEDICARE / Plan: [place]  / Product Type: *No Product type* /   Insurance ID Number:    Payer/Plan Subscr  Sex Relation Sub. Ins. ID Effective Group Num   1.  [address] [facility] 1960 Male Self [id number]                                   [address] [address]         DIAGNOSIS & PROCEDURE:                       Procedure/Operation: LEFT TYMPANOPLASTY            CPT Code: 75152    Diagnosis:  LEFT CHOLESTEATOMA     ICD10 Code: H71.92    Location:  [facility]    Surgeon:  Brooke Montgomery    SCHEDULING INFORMATION:                          Date: 2023    Time: NA              Anesthesia:  General                                                       Status:  Outpatient        Special Comments:  NA         Electronically signed by Bisi Pires MA on 2023 at 9:15 AM

## 2023-05-30 ENCOUNTER — TELEPHONE (OUTPATIENT)
Dept: ENT CLINIC | Age: 63
End: 2023-05-30

## 2023-05-30 NOTE — TELEPHONE ENCOUNTER
Pt's girlfriend Bakari Torres called to get pt's surgery cancelled with [facility] in June as he needs to see his Heart physician first. She stated he will call back when he needs to get it rescheduled.

## 2023-07-25 ENCOUNTER — OFFICE VISIT (OUTPATIENT)
Dept: NEUROSURGERY | Age: 63
End: 2023-07-25
Payer: MEDICARE

## 2023-07-25 VITALS — RESPIRATION RATE: 18 BRPM | HEIGHT: 70 IN | BODY MASS INDEX: 28.63 KG/M2 | WEIGHT: 200 LBS

## 2023-07-25 DIAGNOSIS — M54.50 ACUTE MIDLINE LOW BACK PAIN WITHOUT SCIATICA: Primary | ICD-10-CM

## 2023-07-25 PROCEDURE — 99204 OFFICE O/P NEW MOD 45 MIN: CPT | Performed by: NEUROLOGICAL SURGERY

## 2023-07-25 RX ORDER — GABAPENTIN 300 MG/1
300 CAPSULE ORAL 3 TIMES DAILY
COMMUNITY
Start: 2023-07-03

## 2023-07-25 RX ORDER — DULOXETIN HYDROCHLORIDE 30 MG/1
30 CAPSULE, DELAYED RELEASE ORAL DAILY
COMMUNITY
Start: 2023-05-04

## 2023-07-25 RX ORDER — APIXABAN 5 MG/1
5 TABLET, FILM COATED ORAL 2 TIMES DAILY
COMMUNITY
Start: 2023-07-19

## 2023-07-25 ASSESSMENT — ENCOUNTER SYMPTOMS
RESPIRATORY NEGATIVE: 1
EYES NEGATIVE: 1
ALLERGIC/IMMUNOLOGIC NEGATIVE: 1
BACK PAIN: 1
GASTROINTESTINAL NEGATIVE: 1

## 2023-07-26 NOTE — PROGRESS NOTES
Sergio Borges (:  1960) is a 58 y.o. male,New patient, here for evaluation of the following chief complaint(s):  New Patient (Here for back pain  had 2 injections with no relief  at [place] pain clinic  no therapy  )         ASSESSMENT/PLAN:  1. Acute midline low back pain without sciatica  58year old male with back pain. He had hip injections and is better. He does not have his MRI and will drop it off for me to review    No follow-ups on file. Subjective   SUBJECTIVE/OBJECTIVE:  HPI  58year old male who presents with back pain. The pain also radiates to his hips. He denies any new numbness, tingling or weakness. The pain is made worse with activity and better with rest.  The pain is rated as 7/10 at its worst. He had epidural steroid injections at [facility] pain clinic without significant relief. He did get hip injections yesterday with some relief. He denies loss of control of bowel or bladder function. Review of Systems   Constitutional: Negative. HENT: Negative. Eyes: Negative. Respiratory: Negative. Cardiovascular: Negative. Gastrointestinal: Negative. Endocrine: Negative. Genitourinary: Negative. Musculoskeletal:  Positive for arthralgias and back pain. Skin: Negative. Allergic/Immunologic: Negative. Hematological:  Bruises/bleeds easily. Psychiatric/Behavioral: Negative. Objective   Physical Exam  Vitals reviewed. Constitutional:       General: He is not in acute distress. Appearance: Normal appearance. He is normal weight. He is not ill-appearing, toxic-appearing or diaphoretic. HENT:      Head: Normocephalic and atraumatic. Nose: Nose normal.      Mouth/Throat:      Mouth: Mucous membranes are moist.      Pharynx: Oropharynx is clear. No oropharyngeal exudate. Eyes:      General: No visual field deficit. Extraocular Movements: Extraocular movements intact.       Conjunctiva/sclera: Conjunctivae normal.      Pupils:

## 2023-08-07 ENCOUNTER — TELEPHONE (OUTPATIENT)
Dept: NEUROSURGERY | Age: 63
End: 2023-08-07

## 2023-08-07 NOTE — TELEPHONE ENCOUNTER
It looks like Dr. Jatinder Riggins saw patient on 7/25 and was going to review MRI when it was dropped off. Please look for disc and give to Dr. Jatinder Riggins.

## 2023-11-25 ASSESSMENT — PROMIS GLOBAL HEALTH SCALE
SUM OF RESPONSES TO QUESTIONS 3, 6, 7, & 8: 15
IN THE PAST 7 DAYS, HOW WOULD YOU RATE YOUR PAIN ON AVERAGE [ON A SCALE FROM 0 (NO PAIN) TO 10 (WORST IMAGINABLE PAIN)]?: 10
IN GENERAL, WOULD YOU SAY YOUR QUALITY OF LIFE IS...[ON A SCALE OF 1 (POOR) TO 5 (EXCELLENT)]: 2
SUM OF RESPONSES TO QUESTIONS 2, 4, 5, & 10: 14
HOW IS THE PROMIS V1.1 BEING ADMINISTERED?: 2
IN THE PAST 7 DAYS, HOW OFTEN HAVE YOU BEEN BOTHERED BY EMOTIONAL PROBLEMS, SUCH AS FEELING ANXIOUS, DEPRESSED, OR IRRITABLE [ON A SCALE FROM 1 (NEVER) TO 5 (ALWAYS)]?: 5
IN GENERAL, HOW WOULD YOU RATE YOUR MENTAL HEALTH, INCLUDING YOUR MOOD AND YOUR ABILITY TO THINK [ON A SCALE OF 1 (POOR) TO 5 (EXCELLENT)]?: 5
IN THE PAST 7 DAYS, HOW WOULD YOU RATE YOUR FATIGUE ON AVERAGE [ON A SCALE FROM 1 (NONE) TO 5 (VERY SEVERE)]?: 3
WHO IS THE PERSON COMPLETING THE PROMIS V1.1 SURVEY?: 0
IN GENERAL, PLEASE RATE HOW WELL YOU CARRY OUT YOUR USUAL SOCIAL ACTIVITIES (INCLUDES ACTIVITIES AT HOME, AT WORK, AND IN YOUR COMMUNITY, AND RESPONSIBILITIES AS A PARENT, CHILD, SPOUSE, EMPLOYEE, FRIEND, ETC) [ON A SCALE OF 1 (POOR) TO 5 (EXCELLENT)]?: 2
IN GENERAL, HOW WOULD YOU RATE YOUR PHYSICAL HEALTH [ON A SCALE OF 1 (POOR) TO 5 (EXCELLENT)]?: 1
IN GENERAL, WOULD YOU SAY YOUR HEALTH IS...[ON A SCALE OF 1 (POOR) TO 5 (EXCELLENT)]: 3
TO WHAT EXTENT ARE YOU ABLE TO CARRY OUT YOUR EVERYDAY PHYSICAL ACTIVITIES SUCH AS WALKING, CLIMBING STAIRS, CARRYING GROCERIES, OR MOVING A CHAIR [ON A SCALE OF 1 (NOT AT ALL) TO 5 (COMPLETELY)]?: 1
IN GENERAL, HOW WOULD YOU RATE YOUR SATISFACTION WITH YOUR SOCIAL ACTIVITIES AND RELATIONSHIPS [ON A SCALE OF 1 (POOR) TO 5 (EXCELLENT)]?: 2

## 2023-11-25 ASSESSMENT — HOOS JR
WALKING ON UNEVEN SURFACE: 4
HOOS JR TOTAL INTERVAL SCORE: 15.633
GOING UP OR DOWN STAIRS: 4
HOOS JR RAW SCORE: 22
BENDING TO THE FLOOR TO PICK UP OBJECT: 4
HOOS JR RAW SCORE: 22
LYING IN BED (TURNING OVER, MAINTAINING HIP POSITION): 4
SITTING: 2
RISING FROM SITTING: 4

## 2023-12-08 ENCOUNTER — HOSPITAL ENCOUNTER (OUTPATIENT)
Dept: PREADMISSION TESTING | Age: 63
Discharge: HOME | End: 2023-12-08
Payer: MEDICARE

## 2023-12-08 VITALS
HEIGHT: 70 IN | TEMPERATURE: 97.1 F | RESPIRATION RATE: 16 BRPM | HEART RATE: 87 BPM | DIASTOLIC BLOOD PRESSURE: 66 MMHG | OXYGEN SATURATION: 94 % | WEIGHT: 200 LBS | SYSTOLIC BLOOD PRESSURE: 102 MMHG | BODY MASS INDEX: 28.63 KG/M2

## 2023-12-08 LAB
ANION GAP SERPL CALCULATED.3IONS-SCNC: 11 MMOL/L (ref 7–16)
BUN SERPL-MCNC: 9 MG/DL (ref 6–23)
CALCIUM SERPL-MCNC: 9.5 MG/DL (ref 8.6–10.2)
CHLORIDE SERPL-SCNC: 98 MMOL/L (ref 98–107)
CO2 SERPL-SCNC: 24 MMOL/L (ref 22–29)
CREAT SERPL-MCNC: 0.7 MG/DL (ref 0.7–1.2)
ERYTHROCYTE [DISTWIDTH] IN BLOOD BY AUTOMATED COUNT: 12.3 % (ref 11.5–15)
GFR SERPL CREATININE-BSD FRML MDRD: >60 ML/MIN/1.73M2
GLUCOSE SERPL-MCNC: 107 MG/DL (ref 74–99)
HCT VFR BLD AUTO: 47.1 % (ref 37–54)
HGB BLD-MCNC: 16 G/DL (ref 12.5–16.5)
MCH RBC QN AUTO: 36.3 PG (ref 26–35)
MCHC RBC AUTO-ENTMCNC: 34 G/DL (ref 32–34.5)
MCV RBC AUTO: 106.8 FL (ref 80–99.9)
PLATELET # BLD AUTO: 220 K/UL (ref 130–450)
PMV BLD AUTO: 9.9 FL (ref 7–12)
POTASSIUM SERPL-SCNC: 4.1 MMOL/L (ref 3.5–5)
RBC # BLD AUTO: 4.41 M/UL (ref 3.8–5.8)
SODIUM SERPL-SCNC: 133 MMOL/L (ref 132–146)
WBC OTHER # BLD: 4.7 K/UL (ref 4.5–11.5)

## 2023-12-08 PROCEDURE — 80048 BASIC METABOLIC PNL TOTAL CA: CPT

## 2023-12-08 PROCEDURE — 85027 COMPLETE CBC AUTOMATED: CPT

## 2023-12-08 PROCEDURE — 36415 COLL VENOUS BLD VENIPUNCTURE: CPT

## 2023-12-08 PROCEDURE — 87081 CULTURE SCREEN ONLY: CPT

## 2023-12-08 RX ORDER — CARVEDILOL 3.12 MG/1
3.12 TABLET ORAL 2 TIMES DAILY WITH MEALS
COMMUNITY

## 2023-12-08 NOTE — PROGRESS NOTES
[address] [employer] PRE-ADMISSION TESTING INSTRUCTIONS    The Preadmission Testing patient is instructed accordingly using the following criteria (check applicable):    ARRIVAL INSTRUCTIONS:  [x] Parking the day of Surgery is located in the Main Entrance lot. Upon entering the door, make an immediate right to the surgery reception desk    [x] Bring photo ID and insurance card    [x] Please be sure to arrange for responsible adult to provide transportation to and from the hospital    [x] If you awake am of surgery not feeling well or have temperature >100 please call [phone]    GENERAL INSTRUCTIONS:    [x] Follow instructions for hydration that have been provided to you at your Pre-Admission Visit. Solid food until midnight then clear liquids. No gum, candy or mints. [x] You may brush your teeth    [x] Take medications as instructed     [x] Stop herbal supplements and vitamins 5 days prior to procedure    [x] Follow preop dosing of blood thinners per physician instructions    [x] No tobacco products within 24 hours of surgery     [x] No alcohol or illegal drug use within 24 hours of surgery.     [x] Jewelry, body piercing's, eyeglasses, contact lenses and dentures are not permitted into surgery (bring cases)      [x] Please do not wear any nail polish, make up or hair products on the day of surgery    [x] You can expect a call the business day prior to procedure to notify you if your arrival time changes    [x] If you receive a survey after surgery we would greatly appreciate your comments      [x] Please notify surgeon if you develop any illness between now and time of surgery (cold, cough, sore throat, fever, nausea, vomiting) or any signs of infections  including skin, wounds, and dental.    [x]  The Outpatient Pharmacy is available to fill your prescription here on your day of surgery, ask your preop nurse for details    [] Other instructions    EDUCATIONAL MATERIALS PROVIDED:    [x] PAT

## 2023-12-09 LAB
MICROORGANISM SPEC CULT: NORMAL
SPECIMEN DESCRIPTION: NORMAL

## 2023-12-12 ENCOUNTER — ANESTHESIA EVENT (OUTPATIENT)
Dept: OPERATING ROOM | Age: 63
End: 2023-12-12
Payer: MEDICARE

## 2023-12-12 NOTE — H&P
History and Physical  K. Didi Parikh MD    Chief Complaint     Mohini Castelan returns for follow up of left hip. Date of last x-ray was 07/18/2023. His current pain level is a 6/10. He reports symptoms of stiffness, pain with activities, night pain and daytime pain with rest.  He is taking medication for pain in which takes tylenol and motrin. He has not attended therapy. He arrived today using a wheelchair. History of present illness   Mohini Castelan returns to the office for a preop appointment for left total hip arthroplasty. He continues to have severe bilateral hip pain. The left is worse than the right. He is scheduled for [facility] on December 13, 2023. He takes tylenol and motrin for pain. Mohini Castelan arrived in office via wheelchair. Currently pain is 9 out of 10. The pain is 10 out of 10 at its worst.  Pain is localized to the anterior knee, groin, thigh. Symptoms include instability, swelling, night pain.  Pain is worse with activity and improves with rest.  Previous and current medications include NSAIDS, tylenol  Previous and current treatments include cortisone injection, home exercise program, wheel chair    Physical Exam   Awake, alert, oriented x3  No acute distress  Well developed, well nourished  Eyes appear Normal  No obvious abnormalities  No lower extremity edema present  Respiratory effort: non-labored  Skin Appearance: Normal    Right Hip Exam   Skin Exam:    skin intact  Pain with log roll:   no  Flexion contracture:   no  Painful ROM:   yes  TTP Greater Trochanter:   no  FADIR:   yes  ISAIAS:   yes  Stinchfield:   no  Normal range of motion  Hip flex 90  Hip ext 0  Int rotation 10  Ext rotation 20  Abd  20  Add 15  Neurovascular SILT L1-S1, TA/EHL/GS intact, +2 DP warm well perfused, calf soft and nontender    Left Hip Exam   Skin Exam:   skin intact  Pain with log roll:   no  Flexion contracture:   no  Painful ROM:   yes  TTP Greater Trochanter:   no  FADIR:   yes  ISAIAS:   yes  Stinchfield:

## 2023-12-12 NOTE — ANESTHESIA PRE PROCEDURE
Department of Anesthesiology  Preprocedure Note       Name:  Temo Castaneda   Age:  58 y.o.  :  1960                                          MRN:  [id number]         Date:  2023      Surgeon: Zenobia Roche):  Melody Poole MD    Procedure: Procedure(s):  ROBOTIC IRVING ASSISTED LEFT HIP TOTAL ARTHROPLASTY    +++JOSH+++    Medications prior to admission:   Prior to Admission medications    Medication Sig Start Date End Date Taking? Authorizing Provider   carvedilol (COREG) 3.125 MG tablet Take 1 tablet by mouth 2 times daily (with meals)    Berta Harkins MD   ELIQUIS 5 MG TABS tablet Take 1 tablet by mouth 2 times daily 23   Berta Harkins MD   DULoxetine (CYMBALTA) 30 MG extended release capsule Take 1 capsule by mouth daily  Patient not taking: Reported on 2023   Berta Harkins MD   gabapentin (NEURONTIN) 300 MG capsule Take 1 capsule by mouth 3 times daily. Patient not taking: Reported on 2023 7/3/23   Berta Harkins MD   omeprazole (PRILOSEC) 20 MG delayed release capsule Take 1 capsule by mouth daily    Berta Harkins MD   colchicine (COLCRYS) 0.6 MG tablet take 1 tablet by mouth twice a day 22   Berta Harkins MD   lisinopril (PRINIVIL;ZESTRIL) 5 MG tablet take 1 tablet by mouth once daily 22   Berta Harkins MD       Current medications:    No current facility-administered medications for this encounter. Current Outpatient Medications   Medication Sig Dispense Refill    carvedilol (COREG) 3.125 MG tablet Take 1 tablet by mouth 2 times daily (with meals)      ELIQUIS 5 MG TABS tablet Take 1 tablet by mouth 2 times daily      DULoxetine (CYMBALTA) 30 MG extended release capsule Take 1 capsule by mouth daily (Patient not taking: Reported on 2023)      gabapentin (NEURONTIN) 300 MG capsule Take 1 capsule by mouth 3 times daily.  (Patient not taking: Reported on 2023)      omeprazole (PRILOSEC) 20 MG delayed

## 2023-12-13 ENCOUNTER — HOSPITAL ENCOUNTER (OUTPATIENT)
Age: 63
Setting detail: OBSERVATION
Discharge: HOME HEALTH | End: 2023-12-14
Attending: ORTHOPAEDIC SURGERY | Admitting: ORTHOPAEDIC SURGERY
Payer: MEDICARE

## 2023-12-13 ENCOUNTER — APPOINTMENT (OUTPATIENT)
Dept: GENERAL RADIOLOGY | Age: 63
End: 2023-12-13
Attending: ORTHOPAEDIC SURGERY
Payer: MEDICARE

## 2023-12-13 ENCOUNTER — ANESTHESIA (OUTPATIENT)
Dept: OPERATING ROOM | Age: 63
End: 2023-12-13
Payer: MEDICARE

## 2023-12-13 DIAGNOSIS — M87.052 AVASCULAR NECROSIS OF HIP, LEFT (HCC): Primary | ICD-10-CM

## 2023-12-13 DIAGNOSIS — M16.12 PRIMARY OSTEOARTHRITIS OF LEFT HIP: ICD-10-CM

## 2023-12-13 LAB
EKG ATRIAL RATE: 77 BPM
EKG P AXIS: 35 DEGREES
EKG P-R INTERVAL: 178 MS
EKG Q-T INTERVAL: 378 MS
EKG QRS DURATION: 78 MS
EKG QTC CALCULATION (BAZETT): 427 MS
EKG R AXIS: 47 DEGREES
EKG T AXIS: 51 DEGREES
EKG VENTRICULAR RATE: 77 BPM

## 2023-12-13 PROCEDURE — 6370000000 HC RX 637 (ALT 250 FOR IP): Performed by: ORTHOPAEDIC SURGERY

## 2023-12-13 PROCEDURE — 6360000002 HC RX W HCPCS: Performed by: ANESTHESIOLOGY

## 2023-12-13 PROCEDURE — A4216 STERILE WATER/SALINE, 10 ML: HCPCS | Performed by: ANESTHESIOLOGY

## 2023-12-13 PROCEDURE — 3700000000 HC ANESTHESIA ATTENDED CARE: Performed by: ORTHOPAEDIC SURGERY

## 2023-12-13 PROCEDURE — 2580000003 HC RX 258: Performed by: ANESTHESIOLOGY

## 2023-12-13 PROCEDURE — 73502 X-RAY EXAM HIP UNI 2-3 VIEWS: CPT

## 2023-12-13 PROCEDURE — 6360000002 HC RX W HCPCS: Performed by: ORTHOPAEDIC SURGERY

## 2023-12-13 PROCEDURE — 6360000002 HC RX W HCPCS

## 2023-12-13 PROCEDURE — 2500000003 HC RX 250 WO HCPCS

## 2023-12-13 PROCEDURE — 97161 PT EVAL LOW COMPLEX 20 MIN: CPT

## 2023-12-13 PROCEDURE — 2580000003 HC RX 258

## 2023-12-13 PROCEDURE — 7100000000 HC PACU RECOVERY - FIRST 15 MIN: Performed by: ORTHOPAEDIC SURGERY

## 2023-12-13 PROCEDURE — G0378 HOSPITAL OBSERVATION PER HR: HCPCS

## 2023-12-13 PROCEDURE — 2709999900 HC NON-CHARGEABLE SUPPLY: Performed by: ORTHOPAEDIC SURGERY

## 2023-12-13 PROCEDURE — 2500000003 HC RX 250 WO HCPCS: Performed by: ANESTHESIOLOGY

## 2023-12-13 PROCEDURE — 3600000015 HC SURGERY LEVEL 5 ADDTL 15MIN: Performed by: ORTHOPAEDIC SURGERY

## 2023-12-13 PROCEDURE — 2500000003 HC RX 250 WO HCPCS: Performed by: ORTHOPAEDIC SURGERY

## 2023-12-13 PROCEDURE — 3700000001 HC ADD 15 MINUTES (ANESTHESIA): Performed by: ORTHOPAEDIC SURGERY

## 2023-12-13 PROCEDURE — A4216 STERILE WATER/SALINE, 10 ML: HCPCS | Performed by: ORTHOPAEDIC SURGERY

## 2023-12-13 PROCEDURE — 93010 ELECTROCARDIOGRAM REPORT: CPT | Performed by: INTERNAL MEDICINE

## 2023-12-13 PROCEDURE — C1776 JOINT DEVICE (IMPLANTABLE): HCPCS | Performed by: ORTHOPAEDIC SURGERY

## 2023-12-13 PROCEDURE — 2720000010 HC SURG SUPPLY STERILE: Performed by: ORTHOPAEDIC SURGERY

## 2023-12-13 PROCEDURE — 93005 ELECTROCARDIOGRAM TRACING: CPT | Performed by: STUDENT IN AN ORGANIZED HEALTH CARE EDUCATION/TRAINING PROGRAM

## 2023-12-13 PROCEDURE — 6370000000 HC RX 637 (ALT 250 FOR IP)

## 2023-12-13 PROCEDURE — 2580000003 HC RX 258: Performed by: ORTHOPAEDIC SURGERY

## 2023-12-13 PROCEDURE — 7100000001 HC PACU RECOVERY - ADDTL 15 MIN: Performed by: ORTHOPAEDIC SURGERY

## 2023-12-13 PROCEDURE — 3600000005 HC SURGERY LEVEL 5 BASE: Performed by: ORTHOPAEDIC SURGERY

## 2023-12-13 DEVICE — CERAMIC V40 FEMORAL HEAD
Type: IMPLANTABLE DEVICE | Site: HIP | Status: FUNCTIONAL
Brand: BIOLOX

## 2023-12-13 DEVICE — 127 DEGREE NECK ANGLE HIP STEM
Type: IMPLANTABLE DEVICE | Site: HIP | Status: FUNCTIONAL
Brand: ACCOLADE

## 2023-12-13 DEVICE — TRIDENT II TRITANIUM CLUSTER 54E
Type: IMPLANTABLE DEVICE | Site: HIP | Status: FUNCTIONAL
Brand: TRIDENT II

## 2023-12-13 DEVICE — TRIDENT X3 0 DEGREE POLYETHYLENE INSERT
Type: IMPLANTABLE DEVICE | Site: HIP | Status: FUNCTIONAL
Brand: TRIDENT X3 INSERT

## 2023-12-13 RX ORDER — KETOROLAC TROMETHAMINE 30 MG/ML
30 INJECTION, SOLUTION INTRAMUSCULAR; INTRAVENOUS ONCE
Status: COMPLETED | OUTPATIENT
Start: 2023-12-13 | End: 2023-12-13

## 2023-12-13 RX ORDER — ACETAMINOPHEN 325 MG/1
650 TABLET ORAL EVERY 6 HOURS
Status: DISCONTINUED | OUTPATIENT
Start: 2023-12-13 | End: 2023-12-14 | Stop reason: HOSPADM

## 2023-12-13 RX ORDER — ONDANSETRON 2 MG/ML
INJECTION INTRAMUSCULAR; INTRAVENOUS PRN
Status: DISCONTINUED | OUTPATIENT
Start: 2023-12-13 | End: 2023-12-13 | Stop reason: SDUPTHER

## 2023-12-13 RX ORDER — MIDAZOLAM HYDROCHLORIDE 1 MG/ML
INJECTION INTRAMUSCULAR; INTRAVENOUS PRN
Status: DISCONTINUED | OUTPATIENT
Start: 2023-12-13 | End: 2023-12-13 | Stop reason: SDUPTHER

## 2023-12-13 RX ORDER — SODIUM CHLORIDE 9 MG/ML
INJECTION, SOLUTION INTRAVENOUS CONTINUOUS
Status: DISCONTINUED | OUTPATIENT
Start: 2023-12-13 | End: 2023-12-13

## 2023-12-13 RX ORDER — ONDANSETRON 4 MG/1
4 TABLET, ORALLY DISINTEGRATING ORAL EVERY 8 HOURS PRN
Status: DISCONTINUED | OUTPATIENT
Start: 2023-12-13 | End: 2023-12-14 | Stop reason: HOSPADM

## 2023-12-13 RX ORDER — SODIUM CHLORIDE 9 MG/ML
INJECTION, SOLUTION INTRAVENOUS PRN
Status: DISCONTINUED | OUTPATIENT
Start: 2023-12-13 | End: 2023-12-13 | Stop reason: HOSPADM

## 2023-12-13 RX ORDER — OXYCODONE HYDROCHLORIDE 5 MG/1
10 TABLET ORAL EVERY 4 HOURS PRN
Status: DISCONTINUED | OUTPATIENT
Start: 2023-12-13 | End: 2023-12-14 | Stop reason: HOSPADM

## 2023-12-13 RX ORDER — LIDOCAINE HYDROCHLORIDE 20 MG/ML
INJECTION, SOLUTION EPIDURAL; INFILTRATION; INTRACAUDAL; PERINEURAL PRN
Status: DISCONTINUED | OUTPATIENT
Start: 2023-12-13 | End: 2023-12-13 | Stop reason: SDUPTHER

## 2023-12-13 RX ORDER — PROPOFOL 10 MG/ML
INJECTION, EMULSION INTRAVENOUS CONTINUOUS PRN
Status: DISCONTINUED | OUTPATIENT
Start: 2023-12-13 | End: 2023-12-13 | Stop reason: SDUPTHER

## 2023-12-13 RX ORDER — CARVEDILOL 3.12 MG/1
3.12 TABLET ORAL 2 TIMES DAILY WITH MEALS
Status: DISCONTINUED | OUTPATIENT
Start: 2023-12-13 | End: 2023-12-14 | Stop reason: HOSPADM

## 2023-12-13 RX ORDER — SODIUM CHLORIDE 0.9 % (FLUSH) 0.9 %
5-40 SYRINGE (ML) INJECTION EVERY 12 HOURS SCHEDULED
Status: DISCONTINUED | OUTPATIENT
Start: 2023-12-13 | End: 2023-12-13 | Stop reason: HOSPADM

## 2023-12-13 RX ORDER — SODIUM CHLORIDE 0.9 % (FLUSH) 0.9 %
5-40 SYRINGE (ML) INJECTION PRN
Status: DISCONTINUED | OUTPATIENT
Start: 2023-12-13 | End: 2023-12-13 | Stop reason: HOSPADM

## 2023-12-13 RX ORDER — METOCLOPRAMIDE HYDROCHLORIDE 5 MG/ML
10 INJECTION INTRAMUSCULAR; INTRAVENOUS ONCE
Status: COMPLETED | OUTPATIENT
Start: 2023-12-13 | End: 2023-12-13

## 2023-12-13 RX ORDER — BUPIVACAINE HYDROCHLORIDE 7.5 MG/ML
INJECTION, SOLUTION INTRASPINAL
Status: COMPLETED | OUTPATIENT
Start: 2023-12-13 | End: 2023-12-13

## 2023-12-13 RX ORDER — FENTANYL CITRATE 50 UG/ML
INJECTION, SOLUTION INTRAMUSCULAR; INTRAVENOUS PRN
Status: DISCONTINUED | OUTPATIENT
Start: 2023-12-13 | End: 2023-12-13 | Stop reason: SDUPTHER

## 2023-12-13 RX ORDER — SODIUM CHLORIDE 9 MG/ML
INJECTION, SOLUTION INTRAVENOUS CONTINUOUS
Status: ACTIVE | OUTPATIENT
Start: 2023-12-13 | End: 2023-12-14

## 2023-12-13 RX ORDER — PROCHLORPERAZINE EDISYLATE 5 MG/ML
5 INJECTION INTRAMUSCULAR; INTRAVENOUS
Status: DISCONTINUED | OUTPATIENT
Start: 2023-12-13 | End: 2023-12-13 | Stop reason: HOSPADM

## 2023-12-13 RX ORDER — LABETALOL HYDROCHLORIDE 5 MG/ML
5 INJECTION, SOLUTION INTRAVENOUS
Status: DISCONTINUED | OUTPATIENT
Start: 2023-12-13 | End: 2023-12-13 | Stop reason: HOSPADM

## 2023-12-13 RX ORDER — LISINOPRIL 10 MG/1
5 TABLET ORAL DAILY
Status: DISCONTINUED | OUTPATIENT
Start: 2023-12-14 | End: 2023-12-14 | Stop reason: HOSPADM

## 2023-12-13 RX ORDER — DEXAMETHASONE SODIUM PHOSPHATE 10 MG/ML
8 INJECTION, SOLUTION INTRAMUSCULAR; INTRAVENOUS ONCE
Status: DISCONTINUED | OUTPATIENT
Start: 2023-12-13 | End: 2023-12-13 | Stop reason: HOSPADM

## 2023-12-13 RX ORDER — HYDRALAZINE HYDROCHLORIDE 20 MG/ML
5 INJECTION INTRAMUSCULAR; INTRAVENOUS
Status: DISCONTINUED | OUTPATIENT
Start: 2023-12-13 | End: 2023-12-13 | Stop reason: HOSPADM

## 2023-12-13 RX ORDER — OXYCODONE HYDROCHLORIDE 5 MG/1
5 TABLET ORAL EVERY 4 HOURS PRN
Status: DISCONTINUED | OUTPATIENT
Start: 2023-12-13 | End: 2023-12-14 | Stop reason: HOSPADM

## 2023-12-13 RX ORDER — EPHEDRINE SULFATE/0.9% NACL/PF 50 MG/5 ML
SYRINGE (ML) INTRAVENOUS PRN
Status: DISCONTINUED | OUTPATIENT
Start: 2023-12-13 | End: 2023-12-13 | Stop reason: SDUPTHER

## 2023-12-13 RX ORDER — FENTANYL CITRATE 50 UG/ML
25 INJECTION, SOLUTION INTRAMUSCULAR; INTRAVENOUS EVERY 5 MIN PRN
Status: DISCONTINUED | OUTPATIENT
Start: 2023-12-13 | End: 2023-12-13 | Stop reason: HOSPADM

## 2023-12-13 RX ORDER — EPHEDRINE SULFATE/0.9% NACL/PF 50 MG/5 ML
SYRINGE (ML) INTRAVENOUS
Status: COMPLETED
Start: 2023-12-13 | End: 2023-12-13

## 2023-12-13 RX ORDER — DEXAMETHASONE SODIUM PHOSPHATE 10 MG/ML
10 INJECTION INTRAMUSCULAR; INTRAVENOUS ONCE
Status: COMPLETED | OUTPATIENT
Start: 2023-12-14 | End: 2023-12-14

## 2023-12-13 RX ORDER — SODIUM CHLORIDE 9 MG/ML
INJECTION, SOLUTION INTRAVENOUS PRN
Status: DISCONTINUED | OUTPATIENT
Start: 2023-12-13 | End: 2023-12-14 | Stop reason: HOSPADM

## 2023-12-13 RX ORDER — ACETAMINOPHEN 500 MG
1000 TABLET ORAL ONCE
Status: COMPLETED | OUTPATIENT
Start: 2023-12-13 | End: 2023-12-13

## 2023-12-13 RX ORDER — SODIUM CHLORIDE 0.9 % (FLUSH) 0.9 %
5-40 SYRINGE (ML) INJECTION EVERY 12 HOURS SCHEDULED
Status: DISCONTINUED | OUTPATIENT
Start: 2023-12-13 | End: 2023-12-14 | Stop reason: HOSPADM

## 2023-12-13 RX ORDER — KETOROLAC TROMETHAMINE 30 MG/ML
30 INJECTION, SOLUTION INTRAMUSCULAR; INTRAVENOUS EVERY 6 HOURS
Status: DISCONTINUED | OUTPATIENT
Start: 2023-12-13 | End: 2023-12-14 | Stop reason: HOSPADM

## 2023-12-13 RX ORDER — GLYCOPYRROLATE 0.2 MG/ML
INJECTION INTRAMUSCULAR; INTRAVENOUS PRN
Status: DISCONTINUED | OUTPATIENT
Start: 2023-12-13 | End: 2023-12-13 | Stop reason: SDUPTHER

## 2023-12-13 RX ORDER — MEPERIDINE HYDROCHLORIDE 25 MG/ML
12.5 INJECTION INTRAMUSCULAR; INTRAVENOUS; SUBCUTANEOUS ONCE
Status: DISCONTINUED | OUTPATIENT
Start: 2023-12-13 | End: 2023-12-13 | Stop reason: HOSPADM

## 2023-12-13 RX ORDER — PANTOPRAZOLE SODIUM 40 MG/1
40 TABLET, DELAYED RELEASE ORAL
Status: DISCONTINUED | OUTPATIENT
Start: 2023-12-14 | End: 2023-12-14 | Stop reason: HOSPADM

## 2023-12-13 RX ORDER — DIPHENHYDRAMINE HYDROCHLORIDE 50 MG/ML
12.5 INJECTION INTRAMUSCULAR; INTRAVENOUS
Status: DISCONTINUED | OUTPATIENT
Start: 2023-12-13 | End: 2023-12-13 | Stop reason: HOSPADM

## 2023-12-13 RX ORDER — ONDANSETRON 2 MG/ML
4 INJECTION INTRAMUSCULAR; INTRAVENOUS EVERY 6 HOURS PRN
Status: DISCONTINUED | OUTPATIENT
Start: 2023-12-13 | End: 2023-12-14 | Stop reason: HOSPADM

## 2023-12-13 RX ORDER — VANCOMYCIN HYDROCHLORIDE 1 G/20ML
INJECTION, POWDER, LYOPHILIZED, FOR SOLUTION INTRAVENOUS PRN
Status: DISCONTINUED | OUTPATIENT
Start: 2023-12-13 | End: 2023-12-13 | Stop reason: ALTCHOICE

## 2023-12-13 RX ORDER — SODIUM CHLORIDE 0.9 % (FLUSH) 0.9 %
5-40 SYRINGE (ML) INJECTION PRN
Status: DISCONTINUED | OUTPATIENT
Start: 2023-12-13 | End: 2023-12-14 | Stop reason: HOSPADM

## 2023-12-13 RX ORDER — KETAMINE HYDROCHLORIDE 10 MG/ML
INJECTION INTRAMUSCULAR; INTRAVENOUS PRN
Status: DISCONTINUED | OUTPATIENT
Start: 2023-12-13 | End: 2023-12-13 | Stop reason: SDUPTHER

## 2023-12-13 RX ORDER — COLCHICINE 0.6 MG/1
0.6 TABLET ORAL 2 TIMES DAILY
Status: DISCONTINUED | OUTPATIENT
Start: 2023-12-13 | End: 2023-12-14 | Stop reason: HOSPADM

## 2023-12-13 RX ORDER — SENNA AND DOCUSATE SODIUM 50; 8.6 MG/1; MG/1
1 TABLET, FILM COATED ORAL 2 TIMES DAILY
Status: DISCONTINUED | OUTPATIENT
Start: 2023-12-13 | End: 2023-12-14 | Stop reason: HOSPADM

## 2023-12-13 RX ADMIN — FAMOTIDINE 20 MG: 10 INJECTION, SOLUTION INTRAVENOUS at 08:30

## 2023-12-13 RX ADMIN — MIDAZOLAM 1 MG: 1 INJECTION INTRAMUSCULAR; INTRAVENOUS at 10:05

## 2023-12-13 RX ADMIN — PHENYLEPHRINE HYDROCHLORIDE 100 MCG: 10 INJECTION INTRAVENOUS at 10:24

## 2023-12-13 RX ADMIN — METOCLOPRAMIDE 10 MG: 5 INJECTION, SOLUTION INTRAMUSCULAR; INTRAVENOUS at 08:28

## 2023-12-13 RX ADMIN — WATER 2000 MG: 1 INJECTION INTRAMUSCULAR; INTRAVENOUS; SUBCUTANEOUS at 09:46

## 2023-12-13 RX ADMIN — PHENYLEPHRINE HYDROCHLORIDE 200 MCG: 10 INJECTION INTRAVENOUS at 10:29

## 2023-12-13 RX ADMIN — KETOROLAC TROMETHAMINE 30 MG: 30 INJECTION, SOLUTION INTRAMUSCULAR; INTRAVENOUS at 08:37

## 2023-12-13 RX ADMIN — TRANEXAMIC ACID 1000 MG: 100 INJECTION, SOLUTION INTRAVENOUS at 10:12

## 2023-12-13 RX ADMIN — PROPOFOL 50 MCG/KG/MIN: 10 INJECTION, EMULSION INTRAVENOUS at 10:25

## 2023-12-13 RX ADMIN — PHENYLEPHRINE HYDROCHLORIDE 100 MCG: 10 INJECTION INTRAVENOUS at 11:04

## 2023-12-13 RX ADMIN — WATER 2000 MG: 1 INJECTION INTRAMUSCULAR; INTRAVENOUS; SUBCUTANEOUS at 16:59

## 2023-12-13 RX ADMIN — TRANEXAMIC ACID 1000 MG: 100 INJECTION, SOLUTION INTRAVENOUS at 13:30

## 2023-12-13 RX ADMIN — LIDOCAINE HYDROCHLORIDE 100 MG: 20 INJECTION, SOLUTION EPIDURAL; INFILTRATION; INTRACAUDAL; PERINEURAL at 10:25

## 2023-12-13 RX ADMIN — COLCHICINE 0.6 MG: 0.6 TABLET ORAL at 20:17

## 2023-12-13 RX ADMIN — ACETAMINOPHEN 1000 MG: 500 TABLET ORAL at 08:27

## 2023-12-13 RX ADMIN — GLYCOPYRROLATE 0.2 MG: 0.2 INJECTION, SOLUTION INTRAMUSCULAR; INTRAVENOUS at 10:25

## 2023-12-13 RX ADMIN — BUPIVACAINE HYDROCHLORIDE IN DEXTROSE 15 MG: 7.5 INJECTION, SOLUTION SUBARACHNOID at 10:08

## 2023-12-13 RX ADMIN — SODIUM CHLORIDE: 9 INJECTION, SOLUTION INTRAVENOUS at 09:31

## 2023-12-13 RX ADMIN — FENTANYL CITRATE 50 MCG: 50 INJECTION, SOLUTION INTRAMUSCULAR; INTRAVENOUS at 10:05

## 2023-12-13 RX ADMIN — DOCUSATE SODIUM 50 MG AND SENNOSIDES 8.6 MG 1 TABLET: 8.6; 5 TABLET, FILM COATED ORAL at 20:19

## 2023-12-13 RX ADMIN — PHENYLEPHRINE HYDROCHLORIDE 200 MCG: 10 INJECTION INTRAVENOUS at 10:37

## 2023-12-13 RX ADMIN — ONDANSETRON 4 MG: 2 INJECTION INTRAMUSCULAR; INTRAVENOUS at 11:03

## 2023-12-13 RX ADMIN — CARVEDILOL 3.12 MG: 3.12 TABLET, FILM COATED ORAL at 18:11

## 2023-12-13 RX ADMIN — SODIUM CHLORIDE: 9 INJECTION, SOLUTION INTRAVENOUS at 18:14

## 2023-12-13 RX ADMIN — Medication 10 MG: at 10:49

## 2023-12-13 RX ADMIN — ACETAMINOPHEN 650 MG: 325 TABLET ORAL at 23:44

## 2023-12-13 RX ADMIN — KETAMINE HYDROCHLORIDE 20 MG: 10 INJECTION INTRAMUSCULAR; INTRAVENOUS at 10:05

## 2023-12-13 RX ADMIN — OXYCODONE 5 MG: 5 TABLET ORAL at 23:55

## 2023-12-13 RX ADMIN — KETOROLAC TROMETHAMINE 30 MG: 30 INJECTION, SOLUTION INTRAMUSCULAR; INTRAVENOUS at 23:45

## 2023-12-13 RX ADMIN — ACETAMINOPHEN 650 MG: 325 TABLET ORAL at 17:01

## 2023-12-13 RX ADMIN — PHENYLEPHRINE HYDROCHLORIDE 200 MCG: 10 INJECTION INTRAVENOUS at 10:55

## 2023-12-13 RX ADMIN — Medication 10 MG: at 10:42

## 2023-12-13 RX ADMIN — KETOROLAC TROMETHAMINE 30 MG: 30 INJECTION, SOLUTION INTRAMUSCULAR; INTRAVENOUS at 17:03

## 2023-12-13 RX ADMIN — Medication 10 MG: at 11:15

## 2023-12-13 RX ADMIN — Medication 10 MG: at 11:04

## 2023-12-13 RX ADMIN — SODIUM CHLORIDE, PRESERVATIVE FREE 10 ML: 5 INJECTION INTRAVENOUS at 20:19

## 2023-12-13 ASSESSMENT — PAIN DESCRIPTION - LOCATION
LOCATION: HIP
LOCATION: KNEE
LOCATION: HIP

## 2023-12-13 ASSESSMENT — PAIN DESCRIPTION - ORIENTATION
ORIENTATION: LEFT

## 2023-12-13 ASSESSMENT — PAIN DESCRIPTION - DESCRIPTORS
DESCRIPTORS: DISCOMFORT
DESCRIPTORS: DISCOMFORT
DESCRIPTORS: THROBBING
DESCRIPTORS: DISCOMFORT
DESCRIPTORS: ACHING

## 2023-12-13 ASSESSMENT — PAIN SCALES - GENERAL
PAINLEVEL_OUTOF10: 0
PAINLEVEL_OUTOF10: 5
PAINLEVEL_OUTOF10: 9
PAINLEVEL_OUTOF10: 4
PAINLEVEL_OUTOF10: 0

## 2023-12-13 ASSESSMENT — PAIN - FUNCTIONAL ASSESSMENT: PAIN_FUNCTIONAL_ASSESSMENT: 0-10

## 2023-12-13 ASSESSMENT — LIFESTYLE VARIABLES: SMOKING_STATUS: 0

## 2023-12-13 NOTE — DISCHARGE SUMMARY
Max Barton  [id number]  58 y.o.  1960    Admit date: 12/13/2023    Discharge date and time: 12/14/2023     Admitting Physician: SHEY Gipson MD    Discharge Physician: Juan Pablo Duong. José Miguel Gipson MD    Admission Diagnoses: Left hip AVN, left hip osteoarthritis    Discharge Diagnoses: Same    Admission Condition: Good    Discharged Condition: Good    Procedure and Date: Left hip AVN, left hip osteoarthritis    Hospital Course: A total hip arthroplasty was performed on 12/13/2023. Following this procedure the patient was admitted for a 1 day postoperative hospital stay. During this admission, DVT prophylaxis was followed using bilateral ICDs and ASA. Post-operative pain control was achieved with the use of IV/oral pain medications. Discharge plans were discussed with the patient with the aid of . Disposition: Home    Patient Instructions:     Medications for pain:    Acetaminophen - Take one tablet every 6 hours for 1 week. Then take every 6 hours as needed for pain. Oxycodone 1 tablet every 4 hours as needed for pain. You can take 2 every 6 hours for severe pain. Medication for DVT prophylaxis (Prevention of blood clots)  Aspirin - Take 1 tablet daily for 6 weeks for prevention of blood clots    Medication for constipation:  Colace - Take 1 tablet every 12 hours as needed for constipation        Activity: Weight bear as tolerated with a walker. Posterior hip precautions. Diet: regular diet  Wound Care: Patient should remove dressing in 9 days. Patient can shower with the dressing on but should not bathe. After removing, the dressing keep incision clean and dry    Follow-up with Dr. Levy Cole in 2 weeks.

## 2023-12-13 NOTE — PROGRESS NOTES
Physical Therapy  Facility/Department: [facility] MED SURG  Physical Therapy Initial Assessment    Name: Jackie Lacey  : 1960  MRN: [id number]  Date of Service: 2023           Patient Diagnosis(es): The encounter diagnosis was Primary osteoarthritis of left hip. Past Medical History:  has a past medical history of Arthritis, Atrial fibrillation ([address]), Fractures, and Gout. Past Surgical History:  has a past surgical history that includes Nasal sinus surgery; Ankle fracture surgery (Left, 2019); and Total hip arthroplasty (Left, 2023). Requires PT Follow-Up: Yes       Evaluating Therapist: uJdy Arteaga PT     Referring Provider:  Ronit Cobb MD     PT order : PT eval and treat     Room #: 392  DIAGNOSIS: The encounter diagnosis was Primary osteoarthritis of left hip. S/p L ANA PAULA     PRECAUTIONS: falls , posterolateral hip precautions, FWBAT     Social:  Pt lives with  fiance in a  1  floor plan 3  steps and  1  rails to enter. Prior to admission pt walked with  ww or used grab bars for short distances. Used ww for longer distances      Initial Evaluation  Date:  2023  Treatment      Short Term/ Long Term   Goals   Was pt agreeable to Eval/treatment? Yes      Does pt have pain? L hip pain      Bed Mobility  Rolling:  NT   Supine to sit:  SBA   Sit to supine:  NT   Scooting:  SBA in sit    S/I    Transfers Sit to stand:  CGA   Stand to sit:  CGA   Stand pivot:  NT    SBA    Ambulation     5  feet with ww with min assist    50  feet with  ww  with  SBA        Stair negotiation: ascended and descended NT    4  steps with  1  rail with  CGA    LE ROM  Decreased throughout L hip, distally WFL      LE strength  2- to 3-/ 5 L hip, 3+ to 4- / 5 distally      AM- PAC RAW score  15/ 24            Pt is alert and Oriented      Balance: CGA/min assist with standing/short gait .  Fall risk due to  weakness, decreased balance and activity tolerance     Endurance: decreased

## 2023-12-13 NOTE — PROGRESS NOTES
4 Eyes Skin Assessment     NAME:  Gricelda Butcher  YOB: 1960  MEDICAL RECORD NUMBER:  [id number]    The patient is being assessed for  Admission    I agree that at least one RN has performed a thorough Head to Toe Skin Assessment on the patient. ALL assessment sites listed below have been assessed. Areas assessed by both nurses:    Head, Face, Ears, Shoulders, Back, Chest, Arms, Elbows, Hands, Sacrum. Buttock, Coccyx, Ischium, Legs. Feet and Heels, and Under Medical Devices         Does the Patient have a Wound?  No noted wound(s)       Osmin Prevention initiated by RN: Yes  Wound Care Orders initiated by RN: No    Pressure Injury (Stage 3,4, Unstageable, DTI, NWPT, and Complex wounds) if present, place Wound referral order by RN under : No    New Ostomies, if present place, Ostomy referral order under : No     Nurse 1 eSignature: Electronically signed by Antony Knight RN on 12/13/23 at 6:41 PM EST    **SHARE this note so that the co-signing nurse can place an eSignature**    Nurse 2 eSignature: Electronically signed by Ritu Osuna RN on 12/13/23 at 7:02 PM EST

## 2023-12-13 NOTE — ANESTHESIA POSTPROCEDURE EVALUATION
Department of Anesthesiology  Postprocedure Note    Patient: Beatrice Montiel  MRN: [id number]  YOB: 1960  Date of evaluation: 12/13/2023    Procedure Summary       Date: 12/13/23 Room / Location: [facility] OR 02 / [facility]    Anesthesia Start: 8303 Anesthesia Stop: 1139    Procedure: ROBOTIC IRVING ASSISTED LEFT HIP TOTAL ARTHROPLASTY (Left: Hip) Diagnosis:       Primary osteoarthritis of left hip      (Primary osteoarthritis of left hip [M16.12])    Surgeons: Makayla Vail MD Responsible Provider: Rj Arthur DO    Anesthesia Type: spinal ASA Status: 3            Anesthesia Type: No value filed. Leandra Phase I: Leandra Score: 10    Leandra Phase II:      Anesthesia Post Evaluation    Patient location during evaluation: bedside  Patient participation: complete - patient participated  Level of consciousness: awake  Pain score: 1  Airway patency: patent  Nausea & Vomiting: no vomiting and no nausea  Cardiovascular status: hemodynamically stable  Respiratory status: acceptable  Hydration status: stable  Comments: Seen and examined. Progressing well. Still has residual spinal effect. Multimodal analgesia pain management approach  Pain management: adequate    No notable events documented.

## 2023-12-13 NOTE — INTERVAL H&P NOTE
Update History & Physical    H&P reviewed. No changes.     Electronically signed by Abraham Bear MD on 12/13/2023 at 8:08 AM

## 2023-12-13 NOTE — DISCHARGE INSTRUCTIONS
Orthopaedic Patient Instructions:     Medications for pain:    Acetaminophen - Take one tablet every 6 hours for 1 week. Then take every 6 hours as needed for pain. Oxycodone 1 tablet every 4 hours as needed for pain. You can take 2 every 6 hours for severe pain. Medication for DVT prophylaxis (Prevention of blood clots)  Aspirin - Take 1 tablet daily for 6 weeks for prevention of blood clots    Medication for constipation:  Colace - Take 1 tablet every 12 hours as needed for constipation        Activity: activity as tolerated  Diet: regular diet  Wound Care: Patient should remove dressing in 9 days. Patient can shower with the dressing on but should not bathe. After removing, the dressing keep incision clean and dry    Follow-up with Dr. Gil Rehman in 2 weeks.   Call for an appointment

## 2023-12-13 NOTE — ANESTHESIA PROCEDURE NOTES
Spinal Block    Patient location during procedure: OB  End time: 12/13/2023 10:08 AM  Reason for block: primary anesthetic and at surgeon's request  Staffing  Performed: resident/CRNA   Anesthesiologist: Reba Ferrari DO  Performed by: Reba Ferrari DO  Authorized by: Reba Ferrari DO    Spinal Block  Patient position: sitting  Prep: ChloraPrep  Patient monitoring: cardiac monitor, continuous pulse ox, continuous capnometry and frequent blood pressure checks  Approach: midline  Location: L3/L4  Provider prep: mask, sterile gloves and sterile gown  Needle  Needle type: Pencan   Needle gauge: 25 G  Needle length: 3.5 in  Assessment  Sensory level: T6  Events: cerebrospinal fluid  Swirl obtained: Yes  CSF: clear  Attempts: 1  Hemodynamics: stable  Additional Notes  Epinephrine wash  Preanesthetic Checklist  Completed: patient identified, IV checked, site marked, risks and benefits discussed, surgical/procedural consents, equipment checked, pre-op evaluation, timeout performed, anesthesia consent given, oxygen available, monitors applied/VS acknowledged, fire risk safety assessment completed and verbalized and blood product R/B/A discussed and consented

## 2023-12-14 VITALS
HEART RATE: 102 BPM | OXYGEN SATURATION: 94 % | WEIGHT: 200 LBS | DIASTOLIC BLOOD PRESSURE: 89 MMHG | SYSTOLIC BLOOD PRESSURE: 131 MMHG | TEMPERATURE: 98 F | BODY MASS INDEX: 28.63 KG/M2 | RESPIRATION RATE: 18 BRPM | HEIGHT: 70 IN

## 2023-12-14 LAB
ANION GAP SERPL CALCULATED.3IONS-SCNC: 12 MMOL/L (ref 7–16)
BUN SERPL-MCNC: 11 MG/DL (ref 6–23)
CALCIUM SERPL-MCNC: 8.2 MG/DL (ref 8.6–10.2)
CHLORIDE SERPL-SCNC: 99 MMOL/L (ref 98–107)
CO2 SERPL-SCNC: 22 MMOL/L (ref 22–29)
CREAT SERPL-MCNC: 0.7 MG/DL (ref 0.7–1.2)
ERYTHROCYTE [DISTWIDTH] IN BLOOD BY AUTOMATED COUNT: 12.5 % (ref 11.5–15)
GFR SERPL CREATININE-BSD FRML MDRD: >60 ML/MIN/1.73M2
GLUCOSE SERPL-MCNC: 103 MG/DL (ref 74–99)
HCT VFR BLD AUTO: 36.3 % (ref 37–54)
HGB BLD-MCNC: 12.4 G/DL (ref 12.5–16.5)
MCH RBC QN AUTO: 35.8 PG (ref 26–35)
MCHC RBC AUTO-ENTMCNC: 34.2 G/DL (ref 32–34.5)
MCV RBC AUTO: 104.9 FL (ref 80–99.9)
PLATELET # BLD AUTO: 174 K/UL (ref 130–450)
PMV BLD AUTO: 9.9 FL (ref 7–12)
POTASSIUM SERPL-SCNC: 4.2 MMOL/L (ref 3.5–5)
RBC # BLD AUTO: 3.46 M/UL (ref 3.8–5.8)
SODIUM SERPL-SCNC: 133 MMOL/L (ref 132–146)
WBC OTHER # BLD: 4.7 K/UL (ref 4.5–11.5)

## 2023-12-14 PROCEDURE — 80048 BASIC METABOLIC PNL TOTAL CA: CPT

## 2023-12-14 PROCEDURE — 96375 TX/PRO/DX INJ NEW DRUG ADDON: CPT

## 2023-12-14 PROCEDURE — 96376 TX/PRO/DX INJ SAME DRUG ADON: CPT

## 2023-12-14 PROCEDURE — 96374 THER/PROPH/DIAG INJ IV PUSH: CPT

## 2023-12-14 PROCEDURE — G0378 HOSPITAL OBSERVATION PER HR: HCPCS

## 2023-12-14 PROCEDURE — 97530 THERAPEUTIC ACTIVITIES: CPT

## 2023-12-14 PROCEDURE — 6370000000 HC RX 637 (ALT 250 FOR IP): Performed by: ORTHOPAEDIC SURGERY

## 2023-12-14 PROCEDURE — 2580000003 HC RX 258: Performed by: ORTHOPAEDIC SURGERY

## 2023-12-14 PROCEDURE — 97165 OT EVAL LOW COMPLEX 30 MIN: CPT

## 2023-12-14 PROCEDURE — 6360000002 HC RX W HCPCS: Performed by: ORTHOPAEDIC SURGERY

## 2023-12-14 PROCEDURE — 85027 COMPLETE CBC AUTOMATED: CPT

## 2023-12-14 PROCEDURE — 51798 US URINE CAPACITY MEASURE: CPT

## 2023-12-14 RX ORDER — OXYCODONE HYDROCHLORIDE 5 MG/1
5 TABLET ORAL EVERY 4 HOURS PRN
Qty: 42 TABLET | Refills: 0 | Status: SHIPPED | OUTPATIENT
Start: 2023-12-14 | End: 2023-12-21

## 2023-12-14 RX ORDER — ACETAMINOPHEN 325 MG/1
650 TABLET ORAL EVERY 6 HOURS
Qty: 120 TABLET | Refills: 3 | Status: SHIPPED | OUTPATIENT
Start: 2023-12-14

## 2023-12-14 RX ORDER — SENNA AND DOCUSATE SODIUM 50; 8.6 MG/1; MG/1
1 TABLET, FILM COATED ORAL 2 TIMES DAILY
Qty: 30 TABLET | Refills: 0 | Status: SHIPPED | OUTPATIENT
Start: 2023-12-14

## 2023-12-14 RX ADMIN — KETOROLAC TROMETHAMINE 30 MG: 30 INJECTION, SOLUTION INTRAMUSCULAR; INTRAVENOUS at 05:47

## 2023-12-14 RX ADMIN — CARVEDILOL 3.12 MG: 3.12 TABLET, FILM COATED ORAL at 09:59

## 2023-12-14 RX ADMIN — PANTOPRAZOLE SODIUM 40 MG: 40 TABLET, DELAYED RELEASE ORAL at 05:47

## 2023-12-14 RX ADMIN — OXYCODONE 5 MG: 5 TABLET ORAL at 10:14

## 2023-12-14 RX ADMIN — COLCHICINE 0.6 MG: 0.6 TABLET ORAL at 09:57

## 2023-12-14 RX ADMIN — ACETAMINOPHEN 650 MG: 325 TABLET ORAL at 05:47

## 2023-12-14 RX ADMIN — SODIUM CHLORIDE, PRESERVATIVE FREE 10 ML: 5 INJECTION INTRAVENOUS at 10:00

## 2023-12-14 RX ADMIN — DOCUSATE SODIUM 50 MG AND SENNOSIDES 8.6 MG 1 TABLET: 8.6; 5 TABLET, FILM COATED ORAL at 09:56

## 2023-12-14 RX ADMIN — KETOROLAC TROMETHAMINE 30 MG: 30 INJECTION, SOLUTION INTRAMUSCULAR; INTRAVENOUS at 13:03

## 2023-12-14 RX ADMIN — ACETAMINOPHEN 650 MG: 325 TABLET ORAL at 13:03

## 2023-12-14 RX ADMIN — APIXABAN 5 MG: 5 TABLET, FILM COATED ORAL at 09:59

## 2023-12-14 RX ADMIN — WATER 2000 MG: 1 INJECTION INTRAMUSCULAR; INTRAVENOUS; SUBCUTANEOUS at 01:25

## 2023-12-14 RX ADMIN — DEXAMETHASONE SODIUM PHOSPHATE 10 MG: 10 INJECTION INTRAMUSCULAR; INTRAVENOUS at 05:47

## 2023-12-14 ASSESSMENT — PAIN DESCRIPTION - PAIN TYPE: TYPE: SURGICAL PAIN

## 2023-12-14 ASSESSMENT — PAIN DESCRIPTION - LOCATION
LOCATION: HIP
LOCATION: HIP

## 2023-12-14 ASSESSMENT — PAIN SCALES - GENERAL
PAINLEVEL_OUTOF10: 3
PAINLEVEL_OUTOF10: 5
PAINLEVEL_OUTOF10: 3

## 2023-12-14 ASSESSMENT — PAIN DESCRIPTION - FREQUENCY: FREQUENCY: CONTINUOUS

## 2023-12-14 ASSESSMENT — PAIN DESCRIPTION - DESCRIPTORS
DESCRIPTORS: ACHING
DESCRIPTORS: ACHING;DISCOMFORT;SORE

## 2023-12-14 ASSESSMENT — PAIN DESCRIPTION - ORIENTATION
ORIENTATION: LEFT
ORIENTATION: LEFT

## 2023-12-14 ASSESSMENT — PAIN DESCRIPTION - ONSET: ONSET: ON-GOING

## 2023-12-14 ASSESSMENT — PAIN - FUNCTIONAL ASSESSMENT: PAIN_FUNCTIONAL_ASSESSMENT: ACTIVITIES ARE NOT PREVENTED

## 2023-12-14 NOTE — PROGRESS NOTES
Physical Therapy  [facility]  Physical Therapy Treatment Note    Name: Sara Jimenes  : 1960  MRN: [id number]  Date of Service: 2023           Patient Diagnosis(es): The primary encounter diagnosis was Avascular necrosis of hip, left ([address]). A diagnosis of Primary osteoarthritis of left hip was also pertinent to this visit. Past Medical History:  has a past medical history of Arthritis, Atrial fibrillation ([address]), Fractures, and Gout. Past Surgical History:  has a past surgical history that includes Nasal sinus surgery; Ankle fracture surgery (Left, 2019); and Total hip arthroplasty (Left, 2023). Evaluating Therapist: Veronika Milligan PT     Referring Provider:  Praful Iglesias MD     PT order : PT eval and treat     Room #: 262  DIAGNOSIS: The encounter diagnosis was Primary osteoarthritis of left hip. S/p L ANA PAULA     PRECAUTIONS: falls , posterolateral hip precautions, FWBAT     Social:  Pt lives with  fiance in a  1  floor plan 3  steps and  1  rails to enter. Prior to admission pt walked with  ww or used grab bars for short distances. Used ww for longer distances      Initial Evaluation  Date:  2023  Treatment  2023    Short Term/ Long Term   Goals   Was pt agreeable to Eval/treatment? Yes  yes    Does pt have pain? L hip pain  L hip pain    Bed Mobility  Rolling:  NT   Supine to sit:  SBA   Sit to supine:  NT   Scooting:  SBA in sit  NT  S/I    Transfers Sit to stand:  CGA   Stand to sit:  CGA   Stand pivot:  NT  Sit < >stand: SBA with much effort.   SBA    Ambulation     5  feet with ww with min assist  50 + 60 + 110 feet with [employer] CG/SBA  50  feet with  ww  with  SBA        Stair negotiation: ascended and descended NT  4 steps with B rails SBA, 4 steps with rail/cane CG/Min A, step to pattern used  4  steps with  1  rail with  CGA    LE ROM  Decreased throughout L hip, distally WFL      LE strength  2- to 3-/ 5 L hip, 3+

## 2023-12-14 NOTE — PROGRESS NOTES
Occupational Therapy    OCCUPATIONAL THERAPY INITIAL EVALUATION    [facility]   [address], [place]         AIRW:                                                  Patient Name: Tucker Kothari    MRN: [id number]    : 1960    Room: [address] [place] [address]      Evaluating OT: Mercedes Burkett OTR/L   [id number]      Referring Oralashlee Richey MD     Specific Provider Orders/Date:OT eval and treat 2023      Diagnosis:  Primary osteoarthritis of left hip [M16.12]  Avascular necrosis of hip, left ([address]) [M87.052]    Surgery: L ANA PAULA 2023     Pertinent Medical History: A fib, arthritis, gout,      Precautions:  Fall Risk, WBAT L LE, posterolateral hip precautions     Assessment of current deficits    [x] Functional mobility  [x]ADLs  [x] Strength               []Cognition    [x] Functional transfers   [x] IADLs         [x] Safety Awareness   [x]Endurance    [] Fine Coordination              [x] Balance      [] Vision/perception   []Sensation     []Gross Motor Coordination  [] ROM  [] Delirium                   [] Motor Control     OT PLAN OF CARE   OT POC based on physician orders, patient diagnosis and results of clinical assessment    Frequency/Duration  2-4 days/wk for 2 weeks PRN   Specific OT Treatment Interventions to include:   ADL retraining/adapted techniques and AE recommendations to increase functional independence within precautions                    Energy conservation techniques to improve tolerance for selfcare routine   Functional transfer/mobility training/DME recommendations for increased independence, safety and fall prevention         Patient/family education to increase safety and functional independence             Environmental modifications for safe mobility and completion of ADLs                             Therapeutic activity to improve functional performance during ADLs.

## 2023-12-14 NOTE — PROGRESS NOTES
Physical Therapy    Pt kindly refused 2nd Rx today, states having no difficulties walking to and from the bathroom, states his Son can install a rail at any time, states feels he is ready to go home. Wishes respected.    Beatriz Salinas PTA [id number]

## 2023-12-14 NOTE — PROGRESS NOTES
Orthopaedic Surgery Progress Note  SHEY Blackburn MD      SUBJECTIVE:  No acute events over night. Pain controlled. Denies chest pain or shortness of breath. OBJECTIVE:   AAOx3, NAD    Left lower extremity    Dressings C/D/I  SILT L1-S1  TA/EHL/GS intact  Calf soft, NT  +2 DP, W/WP     Data:  CBC:   Lab Results   Component Value Date/Time    WBC 4.7 12/14/2023 03:35 AM    RBC 3.46 12/14/2023 03:35 AM    HGB 12.4 12/14/2023 03:35 AM    HCT 36.3 12/14/2023 03:35 AM    .9 12/14/2023 03:35 AM    MCH 35.8 12/14/2023 03:35 AM    MCHC 34.2 12/14/2023 03:35 AM    RDW 12.5 12/14/2023 03:35 AM     12/14/2023 03:35 AM    MPV 9.9 12/14/2023 03:35 AM     BMP:    Lab Results   Component Value Date/Time     12/14/2023 03:35 AM    K 4.2 12/14/2023 03:35 AM    CL 99 12/14/2023 03:35 AM    CO2 22 12/14/2023 03:35 AM    BUN 11 12/14/2023 03:35 AM    LABALBU 4.7 05/06/2017 09:54 AM    CREATININE 0.7 12/14/2023 03:35 AM    CALCIUM 8.2 12/14/2023 03:35 AM    GFRAA >60 05/06/2017 09:54 AM    LABGLOM >60 12/14/2023 03:35 AM    GLUCOSE 103 12/14/2023 03:35 AM         A/P: POD 1 left ANA PAULA  - WBAT  - Pain control  - PT/OT  - DVT prophylaxis  - D/C planning for home    SHEY Blackburn MD

## 2023-12-14 NOTE — PLAN OF CARE
Problem: Discharge Planning  Goal: Discharge to home or other facility with appropriate resources  12/13/2023 2245 by Gabby Kunz RN  Outcome: Progressing  12/13/2023 1849 by Cecy Gant RN  Outcome: Progressing     Problem: Pain  Goal: Verbalizes/displays adequate comfort level or baseline comfort level  12/13/2023 2245 by Gabby Kunz RN  Outcome: Progressing  12/13/2023 1849 by Cecy Gant RN  Outcome: Progressing     Problem: Safety - Adult  Goal: Free from fall injury  12/13/2023 2245 by Gabby Kunz RN  Outcome: Progressing  12/13/2023 1849 by Cecy Gant RN  Outcome: Progressing     Problem: ABCDS Injury Assessment  Goal: Absence of physical injury  12/13/2023 2245 by Gabby Kunz RN  Outcome: Progressing  12/13/2023 1849 by Cecy Gant RN  Outcome: Progressing

## 2023-12-14 NOTE — PROGRESS NOTES
CLINICAL PHARMACY NOTE: MEDS TO BEDS    Total # of Prescriptions Filled: 3   The following medications were delivered to the patient:  Oxycodone 5 mg  Tylenol 325 mg  Senna plus 8.6-50 mg    Additional Documentation:

## 2023-12-14 NOTE — CARE COORDINATION
Met with patient about diagnosis and discharge plan of care. POD#1 left ANA PAULA. Pt seen in ortho class. Lives in [facility] in ranch home. Pt has wheeled walker, high commode and tub shower with seat. Plan is home with [facility]-orders completed and notified. PCP is Dr Lenin Martinez.  [place]

## 2023-12-14 NOTE — PROGRESS NOTES
[facility] Quality Flow/Interdisciplinary Rounds Progress Note        Quality Flow Rounds held on December 14, 2023    Disciplines Attending:  Bedside Nurse, , , and Nursing Unit Leadership    Libia Araujo was admitted on 12/13/2023  7:12 AM    Anticipated Discharge Date:       Disposition:    Osmin Score:  Osmin Scale Score: 19    Readmission Risk              Risk of Unplanned Readmission:  0           Discussed patient goal for the day, patient clinical progression, and barriers to discharge.   The following Goal(s) of the Day/Commitment(s) have been identified:   Discharge planning      Hammad De La Rosa RN  December 14, 2023

## 2023-12-14 NOTE — PROGRESS NOTES
Notified Dr. Bren Lester of patients low blood pressure of 88/54 manually. Advised to wait for Dr. Tor Henry to round. No orders given at this time.

## 2024-02-21 ASSESSMENT — PROMIS GLOBAL HEALTH SCALE
SUM OF RESPONSES TO QUESTIONS 3, 6, 7, & 8: 15
IN GENERAL, WOULD YOU SAY YOUR QUALITY OF LIFE IS...[ON A SCALE OF 1 (POOR) TO 5 (EXCELLENT)]: 1
IN GENERAL, HOW WOULD YOU RATE YOUR PHYSICAL HEALTH [ON A SCALE OF 1 (POOR) TO 5 (EXCELLENT)]?: 2
TO WHAT EXTENT ARE YOU ABLE TO CARRY OUT YOUR EVERYDAY PHYSICAL ACTIVITIES SUCH AS WALKING, CLIMBING STAIRS, CARRYING GROCERIES, OR MOVING A CHAIR [ON A SCALE OF 1 (NOT AT ALL) TO 5 (COMPLETELY)]?: 2
IN THE PAST 7 DAYS, HOW OFTEN HAVE YOU BEEN BOTHERED BY EMOTIONAL PROBLEMS, SUCH AS FEELING ANXIOUS, DEPRESSED, OR IRRITABLE [ON A SCALE FROM 1 (NEVER) TO 5 (ALWAYS)]?: 4
IN GENERAL, HOW WOULD YOU RATE YOUR MENTAL HEALTH, INCLUDING YOUR MOOD AND YOUR ABILITY TO THINK [ON A SCALE OF 1 (POOR) TO 5 (EXCELLENT)]?: 4
WHO IS THE PERSON COMPLETING THE PROMIS V1.1 SURVEY?: 0
IN GENERAL, WOULD YOU SAY YOUR HEALTH IS...[ON A SCALE OF 1 (POOR) TO 5 (EXCELLENT)]: 2
SUM OF RESPONSES TO QUESTIONS 2, 4, 5, & 10: 10
IN GENERAL, HOW WOULD YOU RATE YOUR SATISFACTION WITH YOUR SOCIAL ACTIVITIES AND RELATIONSHIPS [ON A SCALE OF 1 (POOR) TO 5 (EXCELLENT)]?: 1
HOW IS THE PROMIS V1.1 BEING ADMINISTERED?: 2
IN THE PAST 7 DAYS, HOW WOULD YOU RATE YOUR FATIGUE ON AVERAGE [ON A SCALE FROM 1 (NONE) TO 5 (VERY SEVERE)]?: 4
IN GENERAL, PLEASE RATE HOW WELL YOU CARRY OUT YOUR USUAL SOCIAL ACTIVITIES (INCLUDES ACTIVITIES AT HOME, AT WORK, AND IN YOUR COMMUNITY, AND RESPONSIBILITIES AS A PARENT, CHILD, SPOUSE, EMPLOYEE, FRIEND, ETC) [ON A SCALE OF 1 (POOR) TO 5 (EXCELLENT)]?: 1
IN THE PAST 7 DAYS, HOW WOULD YOU RATE YOUR PAIN ON AVERAGE [ON A SCALE FROM 0 (NO PAIN) TO 10 (WORST IMAGINABLE PAIN)]?: 7

## 2024-02-21 ASSESSMENT — HOOS JR
SITTING: 0
BENDING TO THE FLOOR TO PICK UP OBJECT: 3
HOOS JR RAW SCORE: 12
HOOS JR TOTAL INTERVAL SCORE: 52.965
LYING IN BED (TURNING OVER, MAINTAINING HIP POSITION): 2
WALKING ON UNEVEN SURFACE: 3
RISING FROM SITTING: 2
GOING UP OR DOWN STAIRS: 2
HOOS JR RAW SCORE: 12

## 2024-02-26 ENCOUNTER — HOSPITAL ENCOUNTER (OUTPATIENT)
Dept: CT IMAGING | Age: 64
Discharge: HOME | End: 2024-02-28
Payer: MEDICARE

## 2024-02-26 DIAGNOSIS — M16.11 PRIMARY OSTEOARTHRITIS OF RIGHT HIP: ICD-10-CM

## 2024-02-26 PROCEDURE — 73700 CT LOWER EXTREMITY W/O DYE: CPT

## 2024-03-01 NOTE — PROGRESS NOTES
Spoke to Qian, at Dr. Marte's office, in regards to any medical or cardiac clearances. She will reach out to the offices.

## 2024-03-04 ENCOUNTER — HOSPITAL ENCOUNTER (OUTPATIENT)
Dept: PREADMISSION TESTING | Age: 64
Discharge: HOME | End: 2024-03-04
Payer: MEDICARE

## 2024-03-04 VITALS
SYSTOLIC BLOOD PRESSURE: 150 MMHG | HEIGHT: 70 IN | OXYGEN SATURATION: 99 % | WEIGHT: 175 LBS | RESPIRATION RATE: 20 BRPM | DIASTOLIC BLOOD PRESSURE: 57 MMHG | BODY MASS INDEX: 25.05 KG/M2 | TEMPERATURE: 97.5 F | HEART RATE: 80 BPM

## 2024-03-04 LAB
ANION GAP SERPL CALCULATED.3IONS-SCNC: 10 MMOL/L (ref 7–16)
BUN SERPL-MCNC: 11 MG/DL (ref 6–23)
CALCIUM SERPL-MCNC: 9.3 MG/DL (ref 8.6–10.2)
CHLORIDE SERPL-SCNC: 101 MMOL/L (ref 98–107)
CO2 SERPL-SCNC: 26 MMOL/L (ref 22–29)
CREAT SERPL-MCNC: 0.7 MG/DL (ref 0.7–1.2)
ERYTHROCYTE [DISTWIDTH] IN BLOOD BY AUTOMATED COUNT: 13.2 % (ref 11.5–15)
GFR SERPL CREATININE-BSD FRML MDRD: >60 ML/MIN/1.73M2
GLUCOSE SERPL-MCNC: 102 MG/DL (ref 74–99)
HCT VFR BLD AUTO: 43.8 % (ref 37–54)
HGB BLD-MCNC: 14.1 G/DL (ref 12.5–16.5)
MCH RBC QN AUTO: 31.1 PG (ref 26–35)
MCHC RBC AUTO-ENTMCNC: 32.2 G/DL (ref 32–34.5)
MCV RBC AUTO: 96.7 FL (ref 80–99.9)
PLATELET # BLD AUTO: 315 K/UL (ref 130–450)
PMV BLD AUTO: 9.6 FL (ref 7–12)
POTASSIUM SERPL-SCNC: 4.5 MMOL/L (ref 3.5–5)
RBC # BLD AUTO: 4.53 M/UL (ref 3.8–5.8)
SODIUM SERPL-SCNC: 137 MMOL/L (ref 132–146)
WBC OTHER # BLD: 7.9 K/UL (ref 4.5–11.5)

## 2024-03-04 PROCEDURE — 85027 COMPLETE CBC AUTOMATED: CPT

## 2024-03-04 PROCEDURE — 36415 COLL VENOUS BLD VENIPUNCTURE: CPT

## 2024-03-04 PROCEDURE — 87081 CULTURE SCREEN ONLY: CPT

## 2024-03-04 PROCEDURE — 80048 BASIC METABOLIC PNL TOTAL CA: CPT

## 2024-03-04 ASSESSMENT — HOOS JR
GOING UP OR DOWN STAIRS: 4
HOOS JR RAW SCORE: 24
SITTING: 4
HOOS JR RAW SCORE: 24
BENDING TO THE FLOOR TO PICK UP OBJECT: 4
WALKING ON UNEVEN SURFACE: 4
HOOS JR TOTAL INTERVAL SCORE: 0
LYING IN BED (TURNING OVER, MAINTAINING HIP POSITION): 4
RISING FROM SITTING: 4

## 2024-03-04 ASSESSMENT — PAIN DESCRIPTION - PAIN TYPE: TYPE: CHRONIC PAIN

## 2024-03-04 ASSESSMENT — PAIN DESCRIPTION - FREQUENCY: FREQUENCY: CONTINUOUS

## 2024-03-04 ASSESSMENT — PAIN SCALES - GENERAL: PAINLEVEL_OUTOF10: 9

## 2024-03-04 ASSESSMENT — PAIN - FUNCTIONAL ASSESSMENT: PAIN_FUNCTIONAL_ASSESSMENT: PREVENTS OR INTERFERES WITH MANY ACTIVE NOT PASSIVE ACTIVITIES

## 2024-03-04 ASSESSMENT — PAIN DESCRIPTION - ORIENTATION: ORIENTATION: RIGHT

## 2024-03-04 ASSESSMENT — PAIN DESCRIPTION - LOCATION: LOCATION: HIP

## 2024-03-04 NOTE — PROGRESS NOTES
[facility] PRE-ADMISSION TESTING INSTRUCTIONS    The Preadmission Testing patient is instructed accordingly using the following criteria (check applicable):    ARRIVAL INSTRUCTIONS:  [x] Parking the day of Surgery is located in the Main Entrance lot.  Upon entering the door, make an immediate right to the surgery reception desk    [x] Bring photo ID and insurance card    [x] Bring in a copy of Living will or Durable Power of  papers.    [x] Please be sure to arrange for responsible adult to provide transportation to and from the hospital    [x] Please arrange for responsible adult to be with you for the 24 hour period post procedure due to having anesthesia    [x] If you awake am of surgery not feeling well or have temperature >100 please call [phone]    GENERAL INSTRUCTIONS:    [x] Follow instructions for hydration that have been provided to you at your Pre-Admission Visit. Solid food until midnight then clear liquids. No gum, candy or mints.    [x] You may brush your teeth, but do not swallow any water    [x] Take medications as instructed with 1-2 oz of water  Carvedilol    [x] No herbal supplements and vitamins 5 days prior to procedure    [x] Follow preop dosing of blood thinners per physician instructions - Eliquis - last dose to be 3/6/2024    [x] No tobacco products within 24 hours of surgery     [x] No alcohol or illegal drug use within 24 hours of surgery.    [x] Jewelry, body piercing's, eyeglasses, contact lenses and dentures are not permitted into surgery (bring cases)      [x] Please do not wear any nail polish, make up or hair products on the day of surgery    [x] You can expect a call the business day prior to procedure to notify you if your arrival time changes    [x] If you receive a survey after surgery we would greatly appreciate your comments    [x] Please notify surgeon if you develop any illness between now and time of surgery (cold, cough, sore throat,

## 2024-03-05 LAB
MICROORGANISM SPEC CULT: NORMAL
SPECIMEN DESCRIPTION: NORMAL

## 2024-03-12 ENCOUNTER — ANESTHESIA EVENT (OUTPATIENT)
Dept: OPERATING ROOM | Age: 64
End: 2024-03-12
Payer: MEDICARE

## 2024-03-12 NOTE — H&P
hip arthroplasty with JORGE.  0- Continue home exercises.  0- Activity Modification.  0- Tylenol.  0- medical clearance  0- cardiac clearance  0- Follow up post operatively  0    I had a long discussion regarding degenerative joint disease.  Operative and nonoperative treatment was discussed. Xrays show severe degenerative changes, nonoperative treatment was failed and pain has become a quality of life issue. David would like to proceed with a Right Jorge Total Hip Arthroplasty.  The surgery, implants, postoperative course and risks and benefits of surgery were discussed and all questions were answered.

## 2024-03-13 ENCOUNTER — APPOINTMENT (OUTPATIENT)
Dept: GENERAL RADIOLOGY | Age: 64
End: 2024-03-13
Attending: ORTHOPAEDIC SURGERY
Payer: MEDICARE

## 2024-03-13 ENCOUNTER — ANESTHESIA (OUTPATIENT)
Dept: OPERATING ROOM | Age: 64
End: 2024-03-13
Payer: MEDICARE

## 2024-03-13 ENCOUNTER — HOSPITAL ENCOUNTER (OUTPATIENT)
Age: 64
Setting detail: OBSERVATION
Discharge: HOME | End: 2024-03-14
Attending: ORTHOPAEDIC SURGERY | Admitting: ORTHOPAEDIC SURGERY
Payer: MEDICARE

## 2024-03-13 DIAGNOSIS — M16.11 PRIMARY OSTEOARTHRITIS OF RIGHT HIP: ICD-10-CM

## 2024-03-13 PROBLEM — M87.051 AVASCULAR NECROSIS OF BONE OF RIGHT HIP (HCC): Status: ACTIVE | Noted: 2024-03-13

## 2024-03-13 PROBLEM — R55 VASOVAGAL SYNCOPE: Status: ACTIVE | Noted: 2024-03-13

## 2024-03-13 LAB — GLUCOSE BLD-MCNC: 112 MG/DL (ref 74–99)

## 2024-03-13 PROCEDURE — 6370000000 HC RX 637 (ALT 250 FOR IP): Performed by: ORTHOPAEDIC SURGERY

## 2024-03-13 PROCEDURE — 97161 PT EVAL LOW COMPLEX 20 MIN: CPT

## 2024-03-13 PROCEDURE — 6360000002 HC RX W HCPCS: Performed by: ANESTHESIOLOGY

## 2024-03-13 PROCEDURE — 7100000000 HC PACU RECOVERY - FIRST 15 MIN: Performed by: ORTHOPAEDIC SURGERY

## 2024-03-13 PROCEDURE — 3600000015 HC SURGERY LEVEL 5 ADDTL 15MIN: Performed by: ORTHOPAEDIC SURGERY

## 2024-03-13 PROCEDURE — G0378 HOSPITAL OBSERVATION PER HR: HCPCS

## 2024-03-13 PROCEDURE — 2580000003 HC RX 258: Performed by: ORTHOPAEDIC SURGERY

## 2024-03-13 PROCEDURE — 7100000001 HC PACU RECOVERY - ADDTL 15 MIN: Performed by: ORTHOPAEDIC SURGERY

## 2024-03-13 PROCEDURE — C1713 ANCHOR/SCREW BN/BN,TIS/BN: HCPCS | Performed by: ORTHOPAEDIC SURGERY

## 2024-03-13 PROCEDURE — 2580000003 HC RX 258

## 2024-03-13 PROCEDURE — 2709999900 HC NON-CHARGEABLE SUPPLY: Performed by: ORTHOPAEDIC SURGERY

## 2024-03-13 PROCEDURE — 97165 OT EVAL LOW COMPLEX 30 MIN: CPT

## 2024-03-13 PROCEDURE — 6370000000 HC RX 637 (ALT 250 FOR IP)

## 2024-03-13 PROCEDURE — 6360000002 HC RX W HCPCS

## 2024-03-13 PROCEDURE — 3600000005 HC SURGERY LEVEL 5 BASE: Performed by: ORTHOPAEDIC SURGERY

## 2024-03-13 PROCEDURE — 3700000001 HC ADD 15 MINUTES (ANESTHESIA): Performed by: ORTHOPAEDIC SURGERY

## 2024-03-13 PROCEDURE — 3700000000 HC ANESTHESIA ATTENDED CARE: Performed by: ORTHOPAEDIC SURGERY

## 2024-03-13 PROCEDURE — 73502 X-RAY EXAM HIP UNI 2-3 VIEWS: CPT

## 2024-03-13 PROCEDURE — C1776 JOINT DEVICE (IMPLANTABLE): HCPCS | Performed by: ORTHOPAEDIC SURGERY

## 2024-03-13 PROCEDURE — 2500000003 HC RX 250 WO HCPCS: Performed by: ORTHOPAEDIC SURGERY

## 2024-03-13 PROCEDURE — 2720000010 HC SURG SUPPLY STERILE: Performed by: ORTHOPAEDIC SURGERY

## 2024-03-13 PROCEDURE — 6360000002 HC RX W HCPCS: Performed by: ORTHOPAEDIC SURGERY

## 2024-03-13 PROCEDURE — 99291 CRITICAL CARE FIRST HOUR: CPT | Performed by: INTERNAL MEDICINE

## 2024-03-13 PROCEDURE — 82962 GLUCOSE BLOOD TEST: CPT

## 2024-03-13 PROCEDURE — A4216 STERILE WATER/SALINE, 10 ML: HCPCS | Performed by: ORTHOPAEDIC SURGERY

## 2024-03-13 PROCEDURE — 2500000003 HC RX 250 WO HCPCS

## 2024-03-13 PROCEDURE — 2580000003 HC RX 258: Performed by: INTERNAL MEDICINE

## 2024-03-13 DEVICE — 127 DEGREE NECK ANGLE HIP STEM
Type: IMPLANTABLE DEVICE | Site: HIP | Status: FUNCTIONAL
Brand: ACCOLADE

## 2024-03-13 DEVICE — TRIDENT II TRITANIUM CLUSTER 54E
Type: IMPLANTABLE DEVICE | Site: HIP | Status: FUNCTIONAL
Brand: TRIDENT II

## 2024-03-13 DEVICE — TRIDENT X3 0 DEGREE POLYETHYLENE INSERT
Type: IMPLANTABLE DEVICE | Site: HIP | Status: FUNCTIONAL
Brand: TRIDENT X3 INSERT

## 2024-03-13 DEVICE — CERAMIC V40 FEMORAL HEAD
Type: IMPLANTABLE DEVICE | Site: HIP | Status: FUNCTIONAL
Brand: BIOLOX

## 2024-03-13 RX ORDER — RIVAROXABAN 10 MG/1
10 TABLET, FILM COATED ORAL DAILY
COMMUNITY

## 2024-03-13 RX ORDER — MIDAZOLAM HYDROCHLORIDE 1 MG/ML
INJECTION INTRAMUSCULAR; INTRAVENOUS PRN
Status: DISCONTINUED | OUTPATIENT
Start: 2024-03-13 | End: 2024-03-13 | Stop reason: SDUPTHER

## 2024-03-13 RX ORDER — FENTANYL CITRATE 50 UG/ML
25 INJECTION, SOLUTION INTRAMUSCULAR; INTRAVENOUS EVERY 5 MIN PRN
Status: DISCONTINUED | OUTPATIENT
Start: 2024-03-13 | End: 2024-03-13 | Stop reason: HOSPADM

## 2024-03-13 RX ORDER — SODIUM CHLORIDE 0.9 % (FLUSH) 0.9 %
5-40 SYRINGE (ML) INJECTION EVERY 12 HOURS SCHEDULED
Status: DISCONTINUED | OUTPATIENT
Start: 2024-03-13 | End: 2024-03-14 | Stop reason: HOSPADM

## 2024-03-13 RX ORDER — KETOROLAC TROMETHAMINE 30 MG/ML
30 INJECTION, SOLUTION INTRAMUSCULAR; INTRAVENOUS EVERY 6 HOURS
Status: DISCONTINUED | OUTPATIENT
Start: 2024-03-13 | End: 2024-03-14 | Stop reason: HOSPADM

## 2024-03-13 RX ORDER — OXYCODONE HYDROCHLORIDE 5 MG/1
5 TABLET ORAL EVERY 4 HOURS PRN
Status: DISCONTINUED | OUTPATIENT
Start: 2024-03-13 | End: 2024-03-14 | Stop reason: HOSPADM

## 2024-03-13 RX ORDER — TRANEXAMIC ACID 10 MG/ML
1000 INJECTION, SOLUTION INTRAVENOUS
Status: COMPLETED | OUTPATIENT
Start: 2024-03-13 | End: 2024-03-13

## 2024-03-13 RX ORDER — DIPHENHYDRAMINE HYDROCHLORIDE 50 MG/ML
12.5 INJECTION INTRAMUSCULAR; INTRAVENOUS
Status: DISCONTINUED | OUTPATIENT
Start: 2024-03-13 | End: 2024-03-13 | Stop reason: HOSPADM

## 2024-03-13 RX ORDER — PHENYLEPHRINE HCL IN 0.9% NACL 1 MG/10 ML
SYRINGE (ML) INTRAVENOUS
Status: DISPENSED
Start: 2024-03-13 | End: 2024-03-14

## 2024-03-13 RX ORDER — ONDANSETRON 4 MG/1
4 TABLET, ORALLY DISINTEGRATING ORAL EVERY 8 HOURS PRN
Status: DISCONTINUED | OUTPATIENT
Start: 2024-03-13 | End: 2024-03-14 | Stop reason: HOSPADM

## 2024-03-13 RX ORDER — ACETAMINOPHEN 325 MG/1
650 TABLET ORAL EVERY 6 HOURS
Status: DISCONTINUED | OUTPATIENT
Start: 2024-03-13 | End: 2024-03-14 | Stop reason: HOSPADM

## 2024-03-13 RX ORDER — 0.9 % SODIUM CHLORIDE 0.9 %
500 INTRAVENOUS SOLUTION INTRAVENOUS ONCE
Status: COMPLETED | OUTPATIENT
Start: 2024-03-13 | End: 2024-03-13

## 2024-03-13 RX ORDER — VANCOMYCIN HYDROCHLORIDE 1 G/20ML
INJECTION, POWDER, LYOPHILIZED, FOR SOLUTION INTRAVENOUS PRN
Status: DISCONTINUED | OUTPATIENT
Start: 2024-03-13 | End: 2024-03-13 | Stop reason: ALTCHOICE

## 2024-03-13 RX ORDER — DEXAMETHASONE SODIUM PHOSPHATE 10 MG/ML
10 INJECTION INTRAMUSCULAR; INTRAVENOUS ONCE
Status: COMPLETED | OUTPATIENT
Start: 2024-03-14 | End: 2024-03-14

## 2024-03-13 RX ORDER — SODIUM CHLORIDE 0.9 % (FLUSH) 0.9 %
5-40 SYRINGE (ML) INJECTION PRN
Status: DISCONTINUED | OUTPATIENT
Start: 2024-03-13 | End: 2024-03-14 | Stop reason: HOSPADM

## 2024-03-13 RX ORDER — SODIUM CHLORIDE 0.9 % (FLUSH) 0.9 %
5-40 SYRINGE (ML) INJECTION PRN
Status: DISCONTINUED | OUTPATIENT
Start: 2024-03-13 | End: 2024-03-13 | Stop reason: HOSPADM

## 2024-03-13 RX ORDER — DEXAMETHASONE SODIUM PHOSPHATE 10 MG/ML
8 INJECTION, SOLUTION INTRAMUSCULAR; INTRAVENOUS ONCE
Status: DISCONTINUED | OUTPATIENT
Start: 2024-03-13 | End: 2024-03-13 | Stop reason: HOSPADM

## 2024-03-13 RX ORDER — TRAMADOL HYDROCHLORIDE 50 MG/1
50 TABLET ORAL EVERY 6 HOURS
Status: DISCONTINUED | OUTPATIENT
Start: 2024-03-13 | End: 2024-03-14 | Stop reason: HOSPADM

## 2024-03-13 RX ORDER — TRANEXAMIC ACID 10 MG/ML
1000 INJECTION, SOLUTION INTRAVENOUS ONCE
Status: COMPLETED | OUTPATIENT
Start: 2024-03-13 | End: 2024-03-13

## 2024-03-13 RX ORDER — SODIUM CHLORIDE 0.9 % (FLUSH) 0.9 %
5-40 SYRINGE (ML) INJECTION EVERY 12 HOURS SCHEDULED
Status: DISCONTINUED | OUTPATIENT
Start: 2024-03-13 | End: 2024-03-13 | Stop reason: HOSPADM

## 2024-03-13 RX ORDER — MEPERIDINE HYDROCHLORIDE 25 MG/ML
12.5 INJECTION INTRAMUSCULAR; INTRAVENOUS; SUBCUTANEOUS EVERY 10 MIN PRN
Status: DISCONTINUED | OUTPATIENT
Start: 2024-03-13 | End: 2024-03-13 | Stop reason: HOSPADM

## 2024-03-13 RX ORDER — SODIUM CHLORIDE 9 MG/ML
INJECTION, SOLUTION INTRAVENOUS CONTINUOUS
Status: DISCONTINUED | OUTPATIENT
Start: 2024-03-13 | End: 2024-03-13

## 2024-03-13 RX ORDER — BUPIVACAINE HYDROCHLORIDE 7.5 MG/ML
INJECTION, SOLUTION EPIDURAL; RETROBULBAR
Status: COMPLETED | OUTPATIENT
Start: 2024-03-13 | End: 2024-03-13

## 2024-03-13 RX ORDER — SODIUM CHLORIDE 9 MG/ML
INJECTION, SOLUTION INTRAVENOUS CONTINUOUS
Status: ACTIVE | OUTPATIENT
Start: 2024-03-13 | End: 2024-03-14

## 2024-03-13 RX ORDER — NALOXONE HYDROCHLORIDE 0.4 MG/ML
INJECTION, SOLUTION INTRAMUSCULAR; INTRAVENOUS; SUBCUTANEOUS PRN
Status: DISCONTINUED | OUTPATIENT
Start: 2024-03-13 | End: 2024-03-13 | Stop reason: HOSPADM

## 2024-03-13 RX ORDER — PHENYLEPHRINE HCL IN 0.9% NACL 1 MG/10 ML
SYRINGE (ML) INTRAVENOUS PRN
Status: DISCONTINUED | OUTPATIENT
Start: 2024-03-13 | End: 2024-03-13 | Stop reason: SDUPTHER

## 2024-03-13 RX ORDER — SODIUM CHLORIDE 9 MG/ML
INJECTION, SOLUTION INTRAVENOUS PRN
Status: DISCONTINUED | OUTPATIENT
Start: 2024-03-13 | End: 2024-03-13 | Stop reason: HOSPADM

## 2024-03-13 RX ORDER — PROCHLORPERAZINE EDISYLATE 5 MG/ML
5 INJECTION INTRAMUSCULAR; INTRAVENOUS
Status: DISCONTINUED | OUTPATIENT
Start: 2024-03-13 | End: 2024-03-13 | Stop reason: HOSPADM

## 2024-03-13 RX ORDER — CARVEDILOL 3.12 MG/1
3.12 TABLET ORAL 2 TIMES DAILY WITH MEALS
Status: DISCONTINUED | OUTPATIENT
Start: 2024-03-13 | End: 2024-03-14 | Stop reason: HOSPADM

## 2024-03-13 RX ORDER — ONDANSETRON 2 MG/ML
4 INJECTION INTRAMUSCULAR; INTRAVENOUS EVERY 6 HOURS PRN
Status: DISCONTINUED | OUTPATIENT
Start: 2024-03-13 | End: 2024-03-14 | Stop reason: HOSPADM

## 2024-03-13 RX ORDER — SENNA AND DOCUSATE SODIUM 50; 8.6 MG/1; MG/1
1 TABLET, FILM COATED ORAL 2 TIMES DAILY
Status: DISCONTINUED | OUTPATIENT
Start: 2024-03-13 | End: 2024-03-14 | Stop reason: HOSPADM

## 2024-03-13 RX ORDER — OXYCODONE HYDROCHLORIDE 5 MG/1
10 TABLET ORAL EVERY 4 HOURS PRN
Status: DISCONTINUED | OUTPATIENT
Start: 2024-03-13 | End: 2024-03-14 | Stop reason: HOSPADM

## 2024-03-13 RX ORDER — PROPOFOL 10 MG/ML
INJECTION, EMULSION INTRAVENOUS CONTINUOUS PRN
Status: DISCONTINUED | OUTPATIENT
Start: 2024-03-13 | End: 2024-03-13 | Stop reason: SDUPTHER

## 2024-03-13 RX ORDER — ACETAMINOPHEN 500 MG
1000 TABLET ORAL ONCE
Status: COMPLETED | OUTPATIENT
Start: 2024-03-13 | End: 2024-03-13

## 2024-03-13 RX ORDER — ASPIRIN 325 MG
325 TABLET, DELAYED RELEASE (ENTERIC COATED) ORAL DAILY
Status: DISCONTINUED | OUTPATIENT
Start: 2024-03-13 | End: 2024-03-14 | Stop reason: HOSPADM

## 2024-03-13 RX ORDER — FENTANYL CITRATE 50 UG/ML
INJECTION, SOLUTION INTRAMUSCULAR; INTRAVENOUS
Status: COMPLETED | OUTPATIENT
Start: 2024-03-13 | End: 2024-03-13

## 2024-03-13 RX ORDER — SODIUM CHLORIDE 9 MG/ML
INJECTION, SOLUTION INTRAVENOUS PRN
Status: DISCONTINUED | OUTPATIENT
Start: 2024-03-13 | End: 2024-03-14 | Stop reason: HOSPADM

## 2024-03-13 RX ORDER — KETOROLAC TROMETHAMINE 30 MG/ML
30 INJECTION, SOLUTION INTRAMUSCULAR; INTRAVENOUS ONCE
Status: COMPLETED | OUTPATIENT
Start: 2024-03-13 | End: 2024-03-13

## 2024-03-13 RX ADMIN — ACETAMINOPHEN 650 MG: 325 TABLET ORAL at 21:27

## 2024-03-13 RX ADMIN — CEFAZOLIN 2000 MG: 2 INJECTION, POWDER, FOR SOLUTION INTRAMUSCULAR; INTRAVENOUS at 18:30

## 2024-03-13 RX ADMIN — OXYCODONE 5 MG: 5 TABLET ORAL at 18:31

## 2024-03-13 RX ADMIN — Medication 100 MCG: at 10:15

## 2024-03-13 RX ADMIN — PROPOFOL 100 MCG/KG/MIN: 10 INJECTION, EMULSION INTRAVENOUS at 10:05

## 2024-03-13 RX ADMIN — Medication 150 MCG: at 10:24

## 2024-03-13 RX ADMIN — Medication 100 MCG: at 10:09

## 2024-03-13 RX ADMIN — MIDAZOLAM 2 MG: 1 INJECTION INTRAMUSCULAR; INTRAVENOUS at 09:45

## 2024-03-13 RX ADMIN — ACETAMINOPHEN 1000 MG: 500 TABLET ORAL at 08:30

## 2024-03-13 RX ADMIN — DOCUSATE SODIUM 50 MG AND SENNOSIDES 8.6 MG 1 TABLET: 8.6; 5 TABLET, FILM COATED ORAL at 21:27

## 2024-03-13 RX ADMIN — SODIUM CHLORIDE 500 ML: 9 INJECTION, SOLUTION INTRAVENOUS at 16:46

## 2024-03-13 RX ADMIN — Medication 150 MCG: at 10:44

## 2024-03-13 RX ADMIN — Medication 100 MCG: at 10:31

## 2024-03-13 RX ADMIN — SODIUM CHLORIDE: 9 INJECTION, SOLUTION INTRAVENOUS at 21:16

## 2024-03-13 RX ADMIN — TRANEXAMIC ACID 1000 MG: 10 INJECTION, SOLUTION INTRAVENOUS at 12:45

## 2024-03-13 RX ADMIN — Medication 100 MCG: at 11:21

## 2024-03-13 RX ADMIN — Medication 100 MCG: at 11:02

## 2024-03-13 RX ADMIN — Medication 100 MCG: at 10:42

## 2024-03-13 RX ADMIN — Medication 100 MCG: at 10:07

## 2024-03-13 RX ADMIN — Medication 100 MCG: at 10:12

## 2024-03-13 RX ADMIN — FENTANYL CITRATE 25 MCG: 50 INJECTION, SOLUTION INTRAMUSCULAR; INTRAVENOUS at 09:58

## 2024-03-13 RX ADMIN — ONDANSETRON 4 MG: 2 INJECTION INTRAMUSCULAR; INTRAVENOUS at 16:42

## 2024-03-13 RX ADMIN — BUPIVACAINE HYDROCHLORIDE 13.5 MG: 7.5 INJECTION, SOLUTION EPIDURAL; RETROBULBAR at 09:58

## 2024-03-13 RX ADMIN — DOCUSATE SODIUM 50 MG AND SENNOSIDES 8.6 MG 1 TABLET: 8.6; 5 TABLET, FILM COATED ORAL at 14:52

## 2024-03-13 RX ADMIN — SODIUM CHLORIDE: 9 INJECTION, SOLUTION INTRAVENOUS at 09:41

## 2024-03-13 RX ADMIN — KETOROLAC TROMETHAMINE 30 MG: 30 INJECTION, SOLUTION INTRAMUSCULAR at 08:31

## 2024-03-13 RX ADMIN — ACETAMINOPHEN 650 MG: 325 TABLET ORAL at 14:52

## 2024-03-13 RX ADMIN — ASPIRIN 325 MG: 325 TABLET, COATED ORAL at 14:52

## 2024-03-13 RX ADMIN — Medication 100 MCG: at 10:18

## 2024-03-13 RX ADMIN — TRAMADOL HYDROCHLORIDE 50 MG: 50 TABLET, COATED ORAL at 14:52

## 2024-03-13 RX ADMIN — KETOROLAC TROMETHAMINE 30 MG: 30 INJECTION, SOLUTION INTRAMUSCULAR at 14:51

## 2024-03-13 RX ADMIN — TRANEXAMIC ACID 1000 MG: 10 INJECTION, SOLUTION INTRAVENOUS at 10:02

## 2024-03-13 RX ADMIN — Medication 150 MCG: at 10:28

## 2024-03-13 RX ADMIN — WATER 2000 MG: 1 INJECTION INTRAMUSCULAR; INTRAVENOUS; SUBCUTANEOUS at 10:02

## 2024-03-13 RX ADMIN — Medication 100 MCG: at 10:35

## 2024-03-13 RX ADMIN — SODIUM CHLORIDE: 9 INJECTION, SOLUTION INTRAVENOUS at 18:09

## 2024-03-13 ASSESSMENT — LIFESTYLE VARIABLES: SMOKING_STATUS: 0

## 2024-03-13 ASSESSMENT — PAIN DESCRIPTION - DESCRIPTORS
DESCRIPTORS: SORE;TENDER;DISCOMFORT
DESCRIPTORS: ACHING
DESCRIPTORS: ACHING;DISCOMFORT
DESCRIPTORS: ACHING
DESCRIPTORS: ACHING;DISCOMFORT

## 2024-03-13 ASSESSMENT — PAIN DESCRIPTION - LOCATION
LOCATION: HIP

## 2024-03-13 ASSESSMENT — PAIN SCALES - GENERAL
PAINLEVEL_OUTOF10: 4
PAINLEVEL_OUTOF10: 0
PAINLEVEL_OUTOF10: 4
PAINLEVEL_OUTOF10: 5
PAINLEVEL_OUTOF10: 0
PAINLEVEL_OUTOF10: 4
PAINLEVEL_OUTOF10: 0
PAINLEVEL_OUTOF10: 4

## 2024-03-13 ASSESSMENT — PAIN - FUNCTIONAL ASSESSMENT
PAIN_FUNCTIONAL_ASSESSMENT: ACTIVITIES ARE NOT PREVENTED
PAIN_FUNCTIONAL_ASSESSMENT: ACTIVITIES ARE NOT PREVENTED
PAIN_FUNCTIONAL_ASSESSMENT: 0-10
PAIN_FUNCTIONAL_ASSESSMENT: 0-10
PAIN_FUNCTIONAL_ASSESSMENT: PREVENTS OR INTERFERES SOME ACTIVE ACTIVITIES AND ADLS

## 2024-03-13 ASSESSMENT — PAIN DESCRIPTION - ORIENTATION
ORIENTATION: RIGHT

## 2024-03-13 NOTE — ANESTHESIA PRE PROCEDURE
Pulmonary Medicine  Cystic Fibrosis - Lung Transplant Team  Progress Note  2023       Patient: Shayne Shoemaker  MRN: [id number]  : 1962 (age 60 year old)  Transplant: 2018 (Lung), POD#1718  Admission date: 2023    Assessment & Plan:     Shayne Shoemaker is a 60 year old male with a PMH significant for s/p bilateral lung transplant for IPF (18), bilateral anastomotic stenosis/bronchomalacia, PsA, Aspergillus (treated with voriconazole), SAMREEN, CMV viremia, shingles, paroxysmal afib, HTN, HLD, GERD, and osteoporosis with vertebral fractures.  The patient was diagnosed with COVID early February, received 3 doses of remdesivir with initial gradual improvement over a week.  Now admitted on 2023 with 2 weeks of fatigue, chills, nausea with emesis x2, and diarrhea.  Also with concern for bacterial pneumonia.      Today's recommendations:  - Follow pending infectious work up  - Continue IV cefepime through 3/10 for total 2 week course (RN CC looking into home/OP IV ABX options v need to consider TCU)  - Continue remdesivir through today  - Tacrolimus level ordered tomorrow  - Continue to hold MMF in setting of COVID and also with diarrhea  - SAMREEN management per transplant ID  - Monitor LFTs daily for now    COVID infection:  Concern for bacterial pneumonia: Initially positive for COVID early February.  On admission the patient denied respiratory symptoms but  noted ENRIQUEZ and small amount of bloody sputum (isolated instance).  No hypoxia, Tmax 100.8 x1 , leukopenia, procal 0.32.  CRP elevated although down trending.  BDG fungitell, A. galactomannan, Cryptococcal Ag, respiratory panel, MRSA nares, and bacterial sputum culture all negative.  CT A/P noted multifocal groundglass micronodular and tree-in-bud type pulmonary opacities at lung bases.  CXR with low lung volumes and perihilar and bibasilar streaky opacities.  Repeat CXR () with slightly decreased perihilar bibasilar  Department of Anesthesiology  Preprocedure Note       Name:  David Bonilla   Age:  63 y.o.  :  1960                                          MRN:  [id number]         Date:  3/13/2024      Surgeon: Surgeon(s):  Shady Marte MD    Procedure: Procedure(s):  ROBOTIC IRVING ASSISTED RIGHT HIP TOTAL ARTHROPLASTY   +++JOSH+++    Medications prior to admission:   Prior to Admission medications    Medication Sig Start Date End Date Taking? Authorizing Provider   rivaroxaban (XARELTO) 10 MG TABS tablet Take 1 tablet by mouth daily PT NOT SURE OF DOSE/ HAS BEEN ON XARELTO SEVERAL MONTHS , DUE TO EXPENSE OF ELIQUIS , HAS BEEN  1 WEEK SINCE LAST DOSE   Yes Berta Harkins MD   acetaminophen (TYLENOL) 325 MG tablet Take 2 tablets by mouth every 6 hours 23   Shady Marte MD   carvedilol (COREG) 3.125 MG tablet Take 1 tablet by mouth 2 times daily (with meals)    Berta Harkins MD   ELIQUIS 5 MG TABS tablet Take 1 tablet by mouth 2 times daily  Patient not taking: Reported on 3/13/2024 7/19/23   ProviderBerta MD       Current medications:    Current Facility-Administered Medications   Medication Dose Route Frequency Provider Last Rate Last Admin   • dexAMETHasone (PF) (DECADRON) injection 8 mg  8 mg IntraVENous Once Schenker, Corinne L, APRN - CNP       • 0.9 % sodium chloride infusion   IntraVENous Continuous Schenker, Corinne L, APRN - CNP       • sodium chloride flush 0.9 % injection 5-40 mL  5-40 mL IntraVENous 2 times per day Schenker, Corinne L, APRN - CNP       • sodium chloride flush 0.9 % injection 5-40 mL  5-40 mL IntraVENous PRN Schenker, Corinne L, APRN - CNP       • 0.9 % sodium chloride infusion   IntraVENous PRN Schenker, Corinne L, APRN - CNP       • ceFAZolin (ANCEF) 2,000 mg in sterile water 20 mL IV syringe  2,000 mg IntraVENous On Call to OR Schenker, Corinne L, APRN - CNP       • tranexamic acid-NaCl IVPB premix 1,000 mg  1,000 mg IntraVENous On Call to OR Tyron  streaky opacities.  Possibly related to recent COVID infection but could represent early bacterial infection.    - Blood cultures (2/24) NGTD  - Fungal Ab (2/28) pending  - Histo Ag blood (2/24) and urine (2/25) pending   - Continue Remdesivir for total of 5 days (EOT 3/1/23)  - ABX: IV cefepime (2/27, s/p IV Zosyn 2/24-2/27) for empiric coverage, given improvement recommend ongoing treatment through 3/10 to complete 2 week course (RN CC looking into home/OP IV ABX options v need to consider TCU)  - Continue remdesivir for 5 days through 3/1  - Nebs: Mucomyst BID and Xopenex BID  - Encourage frequent IS and Aerobika (ordered, discussed with patient)  - AC management per primary team, revisit ongoing need at time of discharge (as COVID positive)    S/p bilateral lung transplant for IPF:  Bilateral anastomotic stenosis/bronchomalacia: Transplant course has been complicated by bronchial stenosis/malacia, currently has left stent in place.  EBV negative 2/16.  DSA negative 2/26.  IgG adequate at 571 on 2/25, no indication for IVIG.  - PTA Singulair and Breo (hospital equivalent) for CLAD  - Pulmonary follow up currently scheduled 4/3    Immunosuppression:  - Tacrolimus 1.5 mg BID.  Goal level 8-10.  Repeat level 3/2 (ordered).    - MMF currently held with COVID and also with diarrhea (prior dose 1500 mg BID)  - Prednisone 5 mg qAM / 2.5 mg qPM    Prophylaxis:   - Bactrim for PJP ppx    SAMREEN: Between 7/2022-9/2022, presented with worsening cough, wheezing, fatigue, and decline in PFTs.  Although CT chest did not show new changes, there were persistent tree-in-bud opacities. BAL (8/19/22) positive for Mycobacterium avium intracellulare complex.  Started treatment given persistent symptoms, culture results, and imaging findings.  - AFB blood culture (2/25) NGTD  - PTA azithromycin, ethambutol, rifabutin with management per transplant ID    Other relevant problems managed by primary team:    Fatigue,  Improving:  Nausea/vomiting, Resolved:  Diarrhea, Improving: Recently positive for COVID as above.  Symptoms waxing and waning but overall fairly persistent for 2 weeks PTA.  CT A/P 2/21 unrevealing.  Denies bloody or dark stools.  C diff (x2), enteric stool panel, Cryptosporidium stain, routine parasitology exam, adenovirus, and microsporidia stain all negative.  Calprotectin stool 54.9.  CMV negative 2/26, h/o CMV reactivation in 2021.  Appetite and N/V improved since admission.  Ongoing diarrhea although less frequent and slightly more formed.  GI consulted 2/28 for consideration of colonoscopy (for biopsies to rule out CMV colitis which can occur even in the absence of CMV viremia) although deferred given some improvement.   Defer management to GI and primary team, revisit colonscopy if symptoms worsen.    Leukopenia:   Anemia: Suspect medication v infection v critical illness BM suppression, Zosyn switched to cefepime 2/27 per transplant ID recommendation.  Peripheral smear unremarkable.  - Parvovirus PCR (2/27) and IgG/IgM (2/28) pending     We appreciate the excellent care provided by the [place] [id number] team.  Recommendations communicated via in person rounding and this note.  Will continue to follow along closely, please do not hesitate to call with any questions or concerns.    Patient discussed with Dr. Vora.    Kita Kaur PA-C  [facility]  Pulmonary CF/Transplant       Subjective & Interval History:     Remains on RA, ENRIQUEZ improving.  Cough nonproductive the past couple days.  Denies new chest pain, some residual from prior shingles infection.  Energy level and appetite improving.  Still with loose stools although overall less frequent and becoming slightly more formed.  Had 3-4 episodes early this morning.  Denies N/V or abdominal pain, some pressure prior to BM.      Review of Systems:     C: No fever, no chills  INTEGUMENTARY/SKIN: No rash or obvious new lesions  ENT/MOUTH: No sore throat, no  "sinus pain, no nasal congestion or drainage  RESP: See interval history  CV: No palpitations, no peripheral edema  GI: No reflux symptoms  : No dysuria  MUSCULOSKELETAL: + back pain / spasm  ENDOCRINE: Blood sugars with adequate control  NEURO: No headache  PSYCHIATRIC: Mood stable    Physical Exam:     All notes, images, and labs from past 24 hours (at minimum) were reviewed.    Vital signs:  Temp: 97.6  F (36.4  C) Temp src: Oral BP: 124/74 Pulse: 89   Resp: 20 SpO2: 96 % O2 Device: None (Room air) Oxygen Delivery: 20 LPM Height: 177.8 cm (5' 10\") Weight: 90 kg (198 lb 6.6 oz)  I/O:     Intake/Output Summary (Last 24 hours) at 3/1/2023 1416  Last data filed at 3/1/2023 0519  Gross per 24 hour   Intake 3 ml   Output 300 ml   Net -297 ml     Constitutional: Lying upright in bed, in no apparent distress.   HEENT: Eyes with pink conjunctivae, anicteric.  Oral mucosa moist without lesions.   PULM: Mildly diminished air flow to bases bilaterally. Bibasilar coarse crackles.  No rhonchi, no wheezes.  Non-labored breathing on RA.  CV: Normal S1 and S2.  RRR.  No murmur, gallop, or rub.  No peripheral edema.   ABD: NABS, soft, nontender, nondistended.    MSK: Moves all extremities.  No apparent muscle wasting.   NEURO: Alert and conversant.   SKIN: Warm, dry.  No rash on limited exam.   PSYCH: Mood stable.     Lines, Drains, and Devices:  Peripheral IV 02/25/23 Left Lower forearm (Active)   Site Assessment WDL 03/01/23 1000   Line Status Saline locked 03/01/23 1000   Dressing Intervention New dressing  02/25/23 1652   Phlebitis Scale 0-->no symptoms 03/01/23 1000   Infiltration Scale 0 03/01/23 1000   Number of days: 4     Data:     LABS    CMP:   Recent Labs   Lab 03/01/23  0725 02/28/23  0658 02/27/23  0849 02/26/23  2331 02/26/23  1610 02/26/23  0701    142 141  --   --  141   POTASSIUM 4.1 4.0 3.6  --   --  3.6   CHLORIDE 110* 110* 110*  --   --  109*   CO2 18* 18* 17*  --   --  17*   ANIONGAP 13 14 14  --   --  " 15   GLC 86 87 111*  --   --  94   BUN 17.0 12.6 11.1  --   --  13.5   CR 1.16 1.26* 1.41*  --   --  1.44*   GFRESTIMATED 72 65 57*  --   --  56*   LACIE 8.0* 8.3* 7.8*  --   --  7.7*   MAG 1.7 1.7 2.0  --  2.9* 1.5*   PHOS 2.8 2.2* 2.1* 1.4*  --  1.7*   PROTTOTAL 6.0* 6.0* 5.8*  --   --  5.6*   ALBUMIN 3.6 3.6 3.3*  --   --  3.2*   BILITOTAL 0.5 0.6 0.6  --   --  0.5   ALKPHOS 57 55 53  --   --  55   AST 78* 46 41  --   --  46   ALT 66* 32 24  --   --  25     CBC:   Recent Labs   Lab 03/01/23  0725 02/28/23  0658 02/27/23  0849 02/26/23  0701   WBC 2.8* 2.0* 1.7* 3.2*   RBC 4.01* 4.05* 3.99* 4.03*   HGB 11.9* 12.1* 11.9* 12.1*   HCT 35.6* 35.9* 35.0* 35.5*   MCV 89 89 88 88   MCH 29.7 29.9 29.8 30.0   MCHC 33.4 33.7 34.0 34.1   RDW 13.1 13.0 12.8 12.8    158 143* 128*       INR:   Recent Labs   Lab 02/26/23  0701   INR 1.06       Glucose:   Recent Labs   Lab 03/01/23  0725 02/28/23  0658 02/27/23  0849 02/26/23  0701 02/25/23  0557 02/24/23  2040   GLC 86 87 111* 94 99 117*       Blood Gas:   Recent Labs   Lab 02/26/23  0701 02/25/23  1009   PHV 7.50* 7.42   PCO2V 28* 25*   PO2V 42 63*   HCO3V 21 16*   MELINDA -0.4 -6.5   O2PER 0 32       Culture Data No results for input(s): CULT in the last 168 hours.    Virology Data:   Lab Results   Component Value Date    FLUAH1 Not Detected 02/25/2023    FLUAH3 Not Detected 02/25/2023    FK9853 Not Detected 02/25/2023    IFLUB Not Detected 02/25/2023    RSVA Not Detected 02/25/2023    RSVB Not Detected 02/25/2023    PIV1 Not Detected 02/25/2023    PIV2 Not Detected 02/25/2023    PIV3 Not Detected 02/25/2023    HMPV Not Detected 02/25/2023    HRVS Negative 12/22/2021    ADVBE Negative 12/22/2021    ADVC Negative 12/22/2021    ADVC Negative 02/04/2021    ADVC Negative 10/29/2020       Historical CMV results (last 3 of prior testing):  Lab Results   Component Value Date    CMVQNT Not Detected 02/26/2023    CMVQNT Not Detected 02/16/2023    CMVQNT Not Detected 02/09/2023     Lab  Results   Component Value Date    CMVLOG 4.8 12/22/2021    CMVLOG Not Calculated 05/09/2021    CMVLOG Not Calculated 05/02/2021       Urine Studies    Recent Labs   Lab Test 02/25/23  0018 02/21/23  1313   URINEPH 5.5 5.5   NITRITE Negative Negative   LEUKEST Negative Negative   WBCU 5 <1       Most Recent [place] Pulmonary Function Testing (FVC/FEV1 only)  FVC-Pre   Date Value Ref Range Status   01/04/2023 3.85 L    10/27/2022 3.82 L    08/09/2022 3.67 L    04/06/2022 3.58 L      FVC-%Pred-Pre   Date Value Ref Range Status   01/04/2023 84 %    10/27/2022 76 %    08/09/2022 73 %    04/06/2022 72 %      FEV1-Pre   Date Value Ref Range Status   01/04/2023 2.85 L    10/27/2022 2.66 L    08/09/2022 2.46 L    04/06/2022 2.85 L      FEV1-%Pred-Pre   Date Value Ref Range Status   01/04/2023 80 %    10/27/2022 69 %    08/09/2022 64 %    04/06/2022 74 %        IMAGING    No results found for this or any previous visit (from the past 48 hour(s)).

## 2024-03-13 NOTE — OP NOTE
Operative Report  David Bonilla  [id number]  3/13/2024    Pre-operative diagnosis: Right hip degenerative joint disease    Post-operative diagnosis: Right hip degenerative joint disease    Procedure: Jorge right total hip arthroplasty    Surgeon: Shady Marte MD    Assistant:  Corinne Schenker, NP; assisted with positioning, retracting and closing     Anesthesia:  Spinal    Operative Indication: David is a 62 y/o male with severe right hip degenerative joint disease and worsening right hip pain for the past year. The pain has become a quality of life issue and the patient has failed nonoperative treatment. He presents for a total hip arthroplasty. The risks and benefits of surgery were discussed and all questions were answered.  The risks for surgery include bleeding, infection, damage to blood vessels, damage to nerves, risk of further surgery, chronic pain,  restricted range of motion, risk of continued discomfort, risk of need for further reconstructive procedures, leg length discrepancy, risk of need for altered activities and altered gait, risk of blood clots, pulmonary embolism, myocardial infarction, and risk of death were discussed. The patient understood these risks and consented for surgery.    EBL: 150 cc    Complications: None    Components: 1. Walter Accolade II Stem, 127°, size 9    2. Walter Trident cup, size 54    3. [place] ceramic head, size 36, -2.5    Operative Procedure:     Following spinal anesthesia, the patient was positioned lateral decubitus with the body supported by a peg board. A [employer] robotic marker was placed on the patella superficially. The affected leg and hip were prepped and draped in the usual standard fashion. The down leg was padded and protected. The hip and groin were sealed with Ioban. An intra-operative time out was called to confirm the planned surgical procedure and administration of IV Ancef. 3 small skin incisions were made over the iliac crest posterior to the

## 2024-03-13 NOTE — ANESTHESIA PROCEDURE NOTES
Spinal Block    Patient location during procedure: OR  End time: 3/13/2024 9:58 AM  Reason for block: primary anesthetic and at surgeon's request  Staffing  Performed: other anesthesia staff   Anesthesiologist: Fab Negro Jr., MD  Resident/CRNA: Gilberto Hernandez APRN - CRNA  Other anesthesia staff: Jeremie Mccloud RN  Performed by: Jeremie Mccloud RN  Authorized by: Fab Negro Jr., MD    Spinal Block  Patient position: sitting  Prep: ChloraPrep and site prepped and draped  Patient monitoring: continuous pulse ox and frequent blood pressure checks  Approach: midline  Location: L3/L4  Provider prep: mask and sterile gloves  Local infiltration: lidocaine  Needle  Needle type: Pencan   Needle gauge: 25 G  Needle length: 3.5 in  Assessment  Sensory level: T6  Events: cerebrospinal fluid  Swirl obtained: Yes  CSF: clear  Attempts: 1  Hemodynamics: stable  Preanesthetic Checklist  Completed: patient identified, IV checked, site marked, risks and benefits discussed, surgical/procedural consents, equipment checked, pre-op evaluation, timeout performed, anesthesia consent given, oxygen available and monitors applied/VS acknowledged

## 2024-03-13 NOTE — CARE COORDINATION
Met with patient about diagnosis and discharge plan of care. Post op right total hip arthroplasty. Pt had previous ortho surgery on left hip 12/14/2023. Lives with girlfriend in ranch home. Pt has wheeled walker, toilet riser tub shower with seat. Plan is home with [facility] per pt choice-notified and orders completed. PCP is Dr Sevilla. Will follow-o

## 2024-03-13 NOTE — INTERVAL H&P NOTE
Update History & Physical    H&P reviewed. No changes.    Electronically signed by Shady Marte MD on 3/13/2024 at 8:39 AM

## 2024-03-13 NOTE — SIGNIFICANT EVENT
RRT note    RRT was called at 1624 due to loss of consciousness.  Upon arrival, patient was sitting up in chair, alert and awake, hemodynamically stable.  Per reports, patient complained of feeling dizzy and nauseous then laying back in his chair was unconscious.  He did have pulses.  Woke up after deep sternal rub.    Patient has a history of A-fib but has been in sinus during that episode.  Remained hemodynamically stable.  Still complaining of nausea and threw up x 1.  Zofran given.  ?Vasovagal.  Patient was moved into bed with no issues.  Telemetry monitor ordered.    Will give 500 cc bolus.  Check orthostatics tomorrow.    CCT 30 mins

## 2024-03-13 NOTE — DISCHARGE SUMMARY
David Bonilla  [id number]  63 y.o.  1960    Admit date: 3/13/2024    Discharge date and time: 3/14/2024    Admitting Physician: SHEY Marte MD    Discharge Physician: SHEY Marte MD    Admission Diagnoses: Left hip avascular necrosis    Discharge Diagnoses: Same    Admission Condition: Good    Discharged Condition: Good    Procedure and Date: Right Total Hip Arthroplasty     Hospital Course: A total hip arthroplasty was performed on 3/13/2024.  Following this procedure the patient was admitted for a 1 day postoperative hospital stay.  During this admission, DVT prophylaxis was followed using bilateral ICDs and ASA. Post-operative pain control was achieved with the use of IV/oral pain medications. Discharge plans were discussed with the patient with the aid of .     Disposition: Home    Patient Instructions:     Medications for pain:    Acetaminophen - Take one tablet every 6 hours for 1 week.  Then take every 6 hours as needed for pain.    Tramadol - Take one tablet every 6 hours for 1 week.  Then take every 6 hours as needed for pain.    Oxycodone 1 tablet every 4 hours as needed for pain.  You can take 2 every 6 hours for severe pain.    Medication for DVT prophylaxis (Prevention of blood clots)  Aspirin - Take 1 tablet daily for 6 weeks for prevention of blood clots    Medication for constipation:  Colace - Take 1 tablet every 12 hours as needed for constipation        Activity: Weight bear as tolerated with a walker. Posterior hip precautions.  Diet: regular diet  Wound Care: Patient should remove dressing in 9 days.  Patient can shower with the dressing on but should not bathe.  After removing, the dressing keep incision clean and dry    Follow-up with Dr. Marte in 2 weeks.

## 2024-03-13 NOTE — DISCHARGE INSTRUCTIONS
Orthopaedic Patient Instructions:     Medications for pain:    Acetaminophen - Take one tablet every 6 hours for 1 week.  Then take every 6 hours as needed for pain.    Tramadol - Take one tablet every 6 hours for 1 week.  Then take every 6 hours as needed for pain.    Oxycodone 1 tablet every 4 hours as needed for pain.  You can take 2 every 6 hours for severe pain.    Medication for DVT prophylaxis (Prevention of blood clots)  Resume Xarelto for prevention of blood clots    Medication for constipation:  Colace - Take 1 tablet every 12 hours as needed for constipation        Activity: activity as tolerated  Diet: regular diet  Wound Care: Patient should remove dressing in 9 days.  Patient can shower with the dressing on but should not bathe.  After removing, the dressing keep incision clean and dry    Follow-up with Dr. Marte in 2 weeks.  Call for an appointment

## 2024-03-14 VITALS
OXYGEN SATURATION: 94 % | WEIGHT: 174 LBS | TEMPERATURE: 98.4 F | DIASTOLIC BLOOD PRESSURE: 64 MMHG | RESPIRATION RATE: 16 BRPM | SYSTOLIC BLOOD PRESSURE: 103 MMHG | BODY MASS INDEX: 24.91 KG/M2 | HEART RATE: 87 BPM | HEIGHT: 70 IN

## 2024-03-14 LAB
ANION GAP SERPL CALCULATED.3IONS-SCNC: 10 MMOL/L (ref 7–16)
BUN SERPL-MCNC: 10 MG/DL (ref 6–23)
CALCIUM SERPL-MCNC: 8 MG/DL (ref 8.6–10.2)
CHLORIDE SERPL-SCNC: 106 MMOL/L (ref 98–107)
CO2 SERPL-SCNC: 21 MMOL/L (ref 22–29)
CREAT SERPL-MCNC: 0.6 MG/DL (ref 0.7–1.2)
ERYTHROCYTE [DISTWIDTH] IN BLOOD BY AUTOMATED COUNT: 13.2 % (ref 11.5–15)
GFR SERPL CREATININE-BSD FRML MDRD: >60 ML/MIN/1.73M2
GLUCOSE SERPL-MCNC: 101 MG/DL (ref 74–99)
HCT VFR BLD AUTO: 36 % (ref 37–54)
HGB BLD-MCNC: 11.5 G/DL (ref 12.5–16.5)
MCH RBC QN AUTO: 30.9 PG (ref 26–35)
MCHC RBC AUTO-ENTMCNC: 31.9 G/DL (ref 32–34.5)
MCV RBC AUTO: 96.8 FL (ref 80–99.9)
PLATELET # BLD AUTO: 221 K/UL (ref 130–450)
PMV BLD AUTO: 9.6 FL (ref 7–12)
POTASSIUM SERPL-SCNC: 4.2 MMOL/L (ref 3.5–5)
RBC # BLD AUTO: 3.72 M/UL (ref 3.8–5.8)
SODIUM SERPL-SCNC: 137 MMOL/L (ref 132–146)
WBC OTHER # BLD: 8.2 K/UL (ref 4.5–11.5)

## 2024-03-14 PROCEDURE — 85027 COMPLETE CBC AUTOMATED: CPT

## 2024-03-14 PROCEDURE — 97530 THERAPEUTIC ACTIVITIES: CPT

## 2024-03-14 PROCEDURE — 6370000000 HC RX 637 (ALT 250 FOR IP): Performed by: ORTHOPAEDIC SURGERY

## 2024-03-14 PROCEDURE — G0378 HOSPITAL OBSERVATION PER HR: HCPCS

## 2024-03-14 PROCEDURE — 96374 THER/PROPH/DIAG INJ IV PUSH: CPT

## 2024-03-14 PROCEDURE — 6360000002 HC RX W HCPCS: Performed by: ORTHOPAEDIC SURGERY

## 2024-03-14 PROCEDURE — 36415 COLL VENOUS BLD VENIPUNCTURE: CPT

## 2024-03-14 PROCEDURE — 96376 TX/PRO/DX INJ SAME DRUG ADON: CPT

## 2024-03-14 PROCEDURE — 80048 BASIC METABOLIC PNL TOTAL CA: CPT

## 2024-03-14 PROCEDURE — 97535 SELF CARE MNGMENT TRAINING: CPT

## 2024-03-14 PROCEDURE — 2580000003 HC RX 258: Performed by: ORTHOPAEDIC SURGERY

## 2024-03-14 PROCEDURE — 96375 TX/PRO/DX INJ NEW DRUG ADDON: CPT

## 2024-03-14 RX ORDER — SENNA AND DOCUSATE SODIUM 50; 8.6 MG/1; MG/1
1 TABLET, FILM COATED ORAL 2 TIMES DAILY
Qty: 30 TABLET | Refills: 0 | Status: SHIPPED | OUTPATIENT
Start: 2024-03-14

## 2024-03-14 RX ORDER — TRAMADOL HYDROCHLORIDE 50 MG/1
50 TABLET ORAL EVERY 6 HOURS
Qty: 28 TABLET | Refills: 0 | Status: SHIPPED | OUTPATIENT
Start: 2024-03-14 | End: 2024-03-21

## 2024-03-14 RX ORDER — ACETAMINOPHEN 325 MG/1
650 TABLET ORAL EVERY 6 HOURS
Qty: 120 TABLET | Refills: 3 | Status: SHIPPED | OUTPATIENT
Start: 2024-03-14

## 2024-03-14 RX ORDER — OXYCODONE HYDROCHLORIDE 5 MG/1
5 TABLET ORAL EVERY 4 HOURS PRN
Qty: 42 TABLET | Refills: 0 | Status: SHIPPED | OUTPATIENT
Start: 2024-03-14 | End: 2024-03-21

## 2024-03-14 RX ADMIN — CEFAZOLIN 2000 MG: 2 INJECTION, POWDER, FOR SOLUTION INTRAMUSCULAR; INTRAVENOUS at 02:00

## 2024-03-14 RX ADMIN — ACETAMINOPHEN 650 MG: 325 TABLET ORAL at 02:55

## 2024-03-14 RX ADMIN — DOCUSATE SODIUM 50 MG AND SENNOSIDES 8.6 MG 1 TABLET: 8.6; 5 TABLET, FILM COATED ORAL at 09:45

## 2024-03-14 RX ADMIN — KETOROLAC TROMETHAMINE 30 MG: 30 INJECTION, SOLUTION INTRAMUSCULAR at 03:14

## 2024-03-14 RX ADMIN — KETOROLAC TROMETHAMINE 30 MG: 30 INJECTION, SOLUTION INTRAMUSCULAR at 09:45

## 2024-03-14 RX ADMIN — ASPIRIN 325 MG: 325 TABLET, COATED ORAL at 09:45

## 2024-03-14 RX ADMIN — RIVAROXABAN 10 MG: 10 TABLET, FILM COATED ORAL at 09:45

## 2024-03-14 RX ADMIN — SODIUM CHLORIDE, PRESERVATIVE FREE 10 ML: 5 INJECTION INTRAVENOUS at 09:46

## 2024-03-14 RX ADMIN — DEXAMETHASONE SODIUM PHOSPHATE 10 MG: 10 INJECTION INTRAMUSCULAR; INTRAVENOUS at 09:45

## 2024-03-14 RX ADMIN — ACETAMINOPHEN 650 MG: 325 TABLET ORAL at 09:45

## 2024-03-14 ASSESSMENT — PAIN - FUNCTIONAL ASSESSMENT
PAIN_FUNCTIONAL_ASSESSMENT: PREVENTS OR INTERFERES SOME ACTIVE ACTIVITIES AND ADLS
PAIN_FUNCTIONAL_ASSESSMENT: PREVENTS OR INTERFERES SOME ACTIVE ACTIVITIES AND ADLS
PAIN_FUNCTIONAL_ASSESSMENT: ACTIVITIES ARE NOT PREVENTED

## 2024-03-14 ASSESSMENT — PAIN DESCRIPTION - ORIENTATION
ORIENTATION: RIGHT

## 2024-03-14 ASSESSMENT — PAIN SCALES - GENERAL
PAINLEVEL_OUTOF10: 6
PAINLEVEL_OUTOF10: 0
PAINLEVEL_OUTOF10: 5
PAINLEVEL_OUTOF10: 2

## 2024-03-14 ASSESSMENT — PAIN DESCRIPTION - DESCRIPTORS
DESCRIPTORS: DISCOMFORT;TENDER;SORE
DESCRIPTORS: DISCOMFORT;TENDER;SHARP
DESCRIPTORS: ACHING;SORE;STABBING

## 2024-03-14 ASSESSMENT — PAIN DESCRIPTION - LOCATION
LOCATION: HIP

## 2024-03-14 NOTE — CARE COORDINATION
Updated plan of care. POD#1 right ANA PAULA. Pt has all DME. Plan is home today with [facility]-o

## 2024-03-14 NOTE — PLAN OF CARE
Problem: Discharge Planning  Goal: Discharge to home or other facility with appropriate resources  Outcome: Progressing  Flowsheets (Taken 3/13/2024 2100)  Discharge to home or other facility with appropriate resources: Identify barriers to discharge with patient and caregiver     Problem: Pain  Goal: Verbalizes/displays adequate comfort level or baseline comfort level  Outcome: Progressing     Problem: Safety - Adult  Goal: Free from fall injury  Outcome: Progressing     Problem: ABCDS Injury Assessment  Goal: Absence of physical injury  Outcome: Progressing

## 2024-03-14 NOTE — PLAN OF CARE
Problem: Discharge Planning  Goal: Discharge to home or other facility with appropriate resources  3/14/2024 1040 by Polina Sawant RN  Outcome: Completed  3/14/2024 0022 by Caroline Neely RN  Outcome: Progressing  Flowsheets (Taken 3/13/2024 2100)  Discharge to home or other facility with appropriate resources: Identify barriers to discharge with patient and caregiver     Problem: Pain  Goal: Verbalizes/displays adequate comfort level or baseline comfort level  3/14/2024 1040 by Polina Sawant RN  Outcome: Completed  3/14/2024 0022 by Caroline Neely RN  Outcome: Progressing     Problem: Safety - Adult  Goal: Free from fall injury  3/14/2024 1040 by Polina Sawant RN  Outcome: Completed  3/14/2024 0022 by Caroline Neely RN  Outcome: Progressing     Problem: ABCDS Injury Assessment  Goal: Absence of physical injury  3/14/2024 1040 by Polina Sawant RN  Outcome: Completed  3/14/2024 0022 by Caroline Neely RN  Outcome: Progressing

## 2024-03-14 NOTE — PROGRESS NOTES
4 Eyes Skin Assessment     NAME:  David Bonilla  YOB: 1960  MEDICAL RECORD NUMBER:  [id number]    The patient is being assessed for  Admission    I agree that at least one RN has performed a thorough Head to Toe Skin Assessment on the patient. ALL assessment sites listed below have been assessed.      Areas assessed by both nurses:    Head, Face, Ears, Shoulders, Back, Chest, Arms, Elbows, Hands, Sacrum. Buttock, Coccyx, Ischium, Legs. Feet and Heels, and Under Medical Devices         Does the Patient have a Wound? No noted wound(s)       R TOTAL HIP    Osmin Prevention initiated by RN: Yes  Wound Care Orders initiated by RN: No    Pressure Injury (Stage 3,4, Unstageable, DTI, NWPT, and Complex wounds) if present, place Wound referral order by RN under : No    New Ostomies, if present place, Ostomy referral order under : No     Nurse 1 eSignature: Electronically signed by Sergio Peralta RN on 3/13/24 at 3:04 PM EDT    **SHARE this note so that the co-signing nurse can place an eSignature**    Nurse 2 eSignature: Electronically signed by Kathy Jerry RN on 3/13/24 at 6:57 PM EDT    
CLINICAL PHARMACY NOTE: MEDS TO BEDS    Total # of Prescriptions Filled: 4   The following medications were delivered to the patient:  Tylenol 325 mg   Oxycodone 5 mg   Tramadol 50 mg   Senna plus 8.6/50 mg    Additional Documentation:    
Dr. Marte is not requesting cardiac clearance. Patient follows with his PCP.   
Occupational Therapy    OCCUPATIONAL THERAPY INITIAL EVALUATION    [facility]   [address] [place], [place]         Date:3/13/2024                                                  Patient Name: David Bonilla    MRN: [id number]    : 1960    Room: 280728      Evaluating OT: Mindy White OTR/L   [id number]      Referring Provider:Shady Marte MD     Specific Provider Orders/Date:OT eval and treat 3/13/2024      Diagnosis:  Primary osteoarthritis of right hip [M16.11]  Avascular necrosis of bone of right hip (HCC) [M87.051]    Procedure: Procedure(s):  ROBOTIC IRVING ASSISTED RIGHT HIP TOTAL ARTHROPLASTY     Pertinent Medical History: A fib, gout, HTN, L ANA PAULA 2023     Precautions:  Fall Risk, WBAT R LE, posterolateral hip precautions     Assessment of current deficits    [x] Functional mobility  [x]ADLs  [x] Strength               []Cognition    [x] Functional transfers   [x] IADLs         [x] Safety Awareness   [x]Endurance    [] Fine Coordination              [x] Balance      [] Vision/perception   []Sensation     []Gross Motor Coordination  [] ROM  [] Delirium                   [] Motor Control     OT PLAN OF CARE   OT POC based on physician orders, patient diagnosis and results of clinical assessment    Frequency/Duration  2-3 days/wk for 2 - 4weeks PRN   Specific OT Treatment Interventions to include:   ADL retraining/adapted techniques and AE recommendations to increase functional independence within precautions                    Energy conservation techniques to improve tolerance for selfcare routine   Functional transfer/mobility training/DME recommendations for increased independence, safety and fall prevention         Patient/family education to increase safety and functional independence             Environmental modifications for safe mobility and completion of ADLs                             Therapeutic activity to improve functional 
Orthopaedic Surgery Progress Note  SHEY Marte MD      SUBJECTIVE:  No acute events over night. Pain controlled. Denies chest pain or shortness of breath.    OBJECTIVE:   AAOx3, NAD    Right lower extremity    Dressings C/D/I  SILT L1-S1  TA/EHL/GS intact  Calf soft, NT  +2 DP, W/WP     Data:  CBC:   Lab Results   Component Value Date/Time    WBC 8.2 03/14/2024 03:57 AM    RBC 3.72 03/14/2024 03:57 AM    HGB 11.5 03/14/2024 03:57 AM    HCT 36.0 03/14/2024 03:57 AM    MCV 96.8 03/14/2024 03:57 AM    MCH 30.9 03/14/2024 03:57 AM    MCHC 31.9 03/14/2024 03:57 AM    RDW 13.2 03/14/2024 03:57 AM     03/14/2024 03:57 AM    MPV 9.6 03/14/2024 03:57 AM     BMP:    Lab Results   Component Value Date/Time     03/14/2024 03:57 AM    K 4.2 03/14/2024 03:57 AM     03/14/2024 03:57 AM    CO2 21 03/14/2024 03:57 AM    BUN 10 03/14/2024 03:57 AM    LABALBU 4.7 05/06/2017 09:54 AM    CREATININE 0.6 03/14/2024 03:57 AM    CALCIUM 8.0 03/14/2024 03:57 AM    GFRAA >60 05/06/2017 09:54 AM    LABGLOM >60 03/14/2024 03:57 AM    GLUCOSE 101 03/14/2024 03:57 AM         A/P: POD 1 right ANA PAULA  - WBAT  - Pain control  - PT/OT  - DVT prophylaxis  - D/C planning for home today    SHEY Marte MD   
Physical Therapy  Facility/Department: [address] [facility]  Daily Treatment Note  NAME: David Bonilla  : 1960  MRN: [id number]    Date of Service: 3/14/2024            Patient Diagnosis(es): The encounter diagnosis was Primary osteoarthritis of right hip.  Past Medical History:  has a past medical history of Arthritis, Atrial fibrillation (HCC), Fractures, Gout, Hypertension, and Right hip pain.  Past Surgical History:  has a past surgical history that includes Nasal sinus surgery; Ankle fracture surgery (Left, 2019); Total hip arthroplasty (Left, 2023); and Total hip arthroplasty (Right, 3/13/2024).     Requires PT Follow-Up: Yes               Evaluating Therapist: Joanne Rose PT      Referring Provider:  Shady Marte MD      PT order : PT eval and treat      Room #: 728  DIAGNOSIS: The encounter diagnosis was Primary osteoarthritis of right  hip. S/p R ANA PAULA      PRECAUTIONS: falls , posterolateral hip precautions, FWBAT      Social:  Pt lives with  fiance in a  1  floor plan 3  steps and  B  rails to enter.  Prior to admission pt walked with  no AD  or  Used ww for longer distances        Initial Evaluation  Date: 3/13/2024  Treatment    3/14/2024   Short Term/ Long Term   Goals   Was pt agreeable to Eval/treatment? Yes   yes     Does pt have pain?  R hip pain   R hip soreness/stiffness      Bed Mobility  Rolling:  NT   Supine to sit:  S/SBA    Sit to supine:  NT   Scooting:  SBA in sit   NT, pt in chair upon arrival   S/I    Transfers Sit to stand:  SBA  Stand to sit:  SBA   Stand pivot:  NT   Sit <> stand : S/I   SBA    Ambulation     100  feet with ww with SBA  150 and 40  feet with ww S/SBA   50  feet with  ww  with  SBA          Stair negotiation: ascended and descended NT   4 steps with B SBA   4  steps with  1  rail with  CGA    LE ROM  Decreased throughout R hip, distally WFL        LE strength   3-/ 5 R hip, 3+ to 4- / 5 distally        AM- PAC RAW score    
Physicians notified of RRT.   
RRT called at 1624  
SPIRITUAL HEALTH SERVICES - [facility]  PROGRESS NOTE    Name: David Bonilla                Hinduism: Unknown   Anointed (Last Rites): NA    Referral: Rapid Response    Assessment:  Upon entering the room  observes medical team working on patient.      Intervention:  Prayed for patient.    Outcome:  Patient unable to respond.    Plan:  Chaplains will remain available to offer spiritual and emotional support as needed.      Electronically signed by Chaplain Aravind, on 3/13/2024 at 4:51 PM.  Spiritual Care Department  [facility]  [phone]      
Voicemail left with Qian at Dr. Marte's office in regard to cardiac clearance.  
Voicemail left with Qian at office in regard to cardiac clearance.   
frank.  He remained seated in the chair at the end of the session.  Chair alarm activated.     Education/treatment:  ADL retraining with facilitation of movement to increase self care skills. Therapeutic activity to address balance and endurance for ADL and transfers.  Pt education of hip precautions, home safety, walker safety, transfer safety.       Pt has made  progress towards set goals.       Time In: 805  Time Out: 835     Min Units   Therapeutic Ex 77176     Therapeutic Activities 77578     ADL/Self Care 99654 30 2   Orthotic Management 05667     Neuro Re-Ed 05025     Non-Billable Time     TOTAL TIMED TREATMENT           Shantell WESTON/L 26142    
tolerance during functional mobility   [x] Transfer Training to improve safety and independence with all functional transfers   [x] Gait Training to improve gait mechanics, endurance and assess need for appropriate assistive device  [x] Stair Training in preparation for safe discharge home and/or into the community   [x] Positioning to prevent skin breakdown and contractures  [x] Safety and Education Training   [x] Patient/Caregiver Education   [] HEP  [] Other      Frequency of treatments will be BID x 2 days.     Time in:  1530   Time out:  1547        Evaluation Time includes thorough review of current medical information, gathering information on past medical history/social history and prior level of function, completion of standardized testing/informal observation of tasks, assessment of data and education on plan of care and goals.     CPT codes:  [x] Low Complexity PT evaluation 91598  [] Moderate Complexity PT evaluation 36458  [] High Complexity PT evaluation 73362  [] PT Re-evaluation 24239  [] Gait training 13259  minutes  [] Therapeutic activities 34194  minutes  [] Therapeutic exercises 31877  minutes  [] Neuromuscular reeducation 56462  minutes         Joanne Rose  License number:  [id number]

## 2024-03-18 LAB — SURGICAL PATHOLOGY REPORT: NORMAL

## (undated) DEVICE — SOLUTION IRRIG 3000ML 0.9% SOD CHL USP UROMATIC PLAS CONT

## (undated) DEVICE — PACK PROCEDURE SURG GEN CUST

## (undated) DEVICE — BANDAGE COMPR L W4INXL11YD 100% COT WVN E DBL LEN CLP CLSR

## (undated) DEVICE — DOUBLE BASIN SET: Brand: MEDLINE INDUSTRIES, INC.

## (undated) DEVICE — SOLUTION IRRIG 1000ML 0.9% SOD CHL USP POUR PLAS BTL

## (undated) DEVICE — TUBING, SUCTION, 9/32" X 10', STRAIGHT: Brand: MEDLINE

## (undated) DEVICE — STRIP,CLOSURE,WOUND,MEDI-STRIP,1/2X4: Brand: MEDLINE

## (undated) DEVICE — NEEDLE,22GX1.5",REG,BEVEL: Brand: MEDLINE

## (undated) DEVICE — 3M™ STERI-DRAPE™ U-DRAPE 1015: Brand: STERI-DRAPE™

## (undated) DEVICE — LIQUIBAND RAPID ADHESIVE 36/CS 0.8ML: Brand: MEDLINE

## (undated) DEVICE — BLADE ES L6IN ELASTOMERIC COAT EXT DURABLE BEND UPTO 90DEG

## (undated) DEVICE — 2108 SERIES SAGITTAL BLADE, OFFSET (20.0 X 0.89 X 80.0MM)

## (undated) DEVICE — GAUZE,SPONGE,4"X4",16PLY,STRL,LF,10/TRAY: Brand: MEDLINE

## (undated) DEVICE — PITCHER PT 1200ML W HNDL CSR WRP

## (undated) DEVICE — DRAPE,REIN 53X77,STERILE: Brand: MEDLINE

## (undated) DEVICE — TAPE ADH W3INXL10YD WHT COT WVN BK POWERFUL RUB BASE HIGHLY

## (undated) DEVICE — SOLUTION IV 1000ML 0.9% SOD CHL PH 5 INJ USP VIAFLX PLAS

## (undated) DEVICE — 3M™ IOBAN™ 2 ANTIMICROBIAL INCISE DRAPE 6650EZ: Brand: IOBAN™ 2

## (undated) DEVICE — 1000 S-DRAPE TOWEL DRAPE 10/BX: Brand: STERI-DRAPE™

## (undated) DEVICE — GLOVE SURG SZ 65 L12IN FNGR THK79MIL GRN LTX FREE

## (undated) DEVICE — 4-PORT MANIFOLD: Brand: NEPTUNE 2

## (undated) DEVICE — INTENDED FOR TISSUE SEPARATION, AND OTHER PROCEDURES THAT REQUIRE A SHARP SURGICAL BLADE TO PUNCTURE OR CUT.: Brand: BARD-PARKER ® STAINLESS STEEL BLADES

## (undated) DEVICE — SPONGE,LAP,12"X12",XR,ST,5/PK,40PK/CS: Brand: MEDLINE

## (undated) DEVICE — SHEET,DRAPE,40X58,STERILE: Brand: MEDLINE

## (undated) DEVICE — CYSTO/BLADDER IRRIGATION SET, REGULATING CLAMP

## (undated) DEVICE — Device

## (undated) DEVICE — PEEL-AWAY HOOD: Brand: FLYTE, SURGICOOL

## (undated) DEVICE — SKN PREP SPNG STKS PVP PNT STR: Brand: MEDLINE INDUSTRIES, INC.

## (undated) DEVICE — SHEET DRAPE FULL 70X100

## (undated) DEVICE — TOWEL,OR,DSP,ST,BLUE,STD,6/PK,12PK/CS: Brand: MEDLINE

## (undated) DEVICE — HANDPIECE SET WITH COAXIAL HIGH FLOW TIP AND SUCTION TUBE: Brand: INTERPULSE

## (undated) DEVICE — 3M™ TEGADERM™ TRANSPARENT FILM DRESSING FRAME STYLE, 1626W, 4 IN X 4-3/4 IN (10 CM X 12 CM), 50/CT 4CT/CASE: Brand: 3M™ TEGADERM™

## (undated) DEVICE — GOWN,SIRUS,POLYRNF,BRTHSLV,XLN/XL,20/CS: Brand: MEDLINE

## (undated) DEVICE — GAUZE,SPONGE,4"X4",8PLY,STRL,LF,10/TRAY: Brand: MEDLINE

## (undated) DEVICE — NDL CNTR 40CT FM MAG: Brand: MEDLINE INDUSTRIES, INC.

## (undated) DEVICE — Z DISCONTINUED USE 2275686 GLOVE SURG SZ 8 L12IN FNGR THK13MIL WHT ISOLEX POLYISOPRENE

## (undated) DEVICE — GLOVE SURG SZ 6 THK91MIL LTX FREE SYN POLYISOPRENE ANTI

## (undated) DEVICE — COVER,LIGHT HANDLE,FLX,1/PK: Brand: MEDLINE INDUSTRIES, INC.

## (undated) DEVICE — GLOVE SURG SZ 65 THK91MIL LTX FREE SYN POLYISOPRENE

## (undated) DEVICE — SOLUTION IV IRRIG POUR BRL 0.9% SODIUM CHL 2F7124

## (undated) DEVICE — GOWN,SIRUS,FABRNF,XL,20/CS: Brand: MEDLINE

## (undated) DEVICE — SET MAJOR INSTR ORTHO

## (undated) DEVICE — K-WIRE
Type: IMPLANTABLE DEVICE | Site: HIP | Status: NON-FUNCTIONAL
Brand: PRO
Removed: 2023-12-13

## (undated) DEVICE — SUTURE STRATAFIX SYMMETRIC PDS + SZ 1 L18IN ABSRB VLT OS-6 SXPP1A201

## (undated) DEVICE — MARKER RAD 3.5MM HEX CKPT STEREOTAXIC IMAG LESION LOC FOR

## (undated) DEVICE — BANDAGE COMPR W4INXL5YD WHT BGE POLY COT M E WRP WV HK AND

## (undated) DEVICE — TOTAL HIP PK

## (undated) DEVICE — BANDAGE COMPR M W6INXL10YD WHT BGE VELC E MTRX HK AND LOOP

## (undated) DEVICE — SYRINGE MED 30ML STD CLR PLAS LUERLOCK TIP N CTRL DISP

## (undated) DEVICE — BANDAGE,ELASTIC,ESMARK,STERILE,4"X9',LF: Brand: MEDLINE

## (undated) DEVICE — STANDARD HYPODERMIC NEEDLE,POLYPROPYLENE HUB: Brand: MONOJECT

## (undated) DEVICE — SPONGE LAP W18XL18IN WHT COT 4 PLY FLD STRUNG RADPQ DISP ST 2 PER PACK

## (undated) DEVICE — ELECTRODE PT RET AD L9FT HI MOIST COND ADH HYDRGEL CORDED

## (undated) DEVICE — KIT DRP FOR RIO ROBOTIC ARM ASST SYS

## (undated) DEVICE — DRAPE 64X41IN RADIOLOGY C ARM EQUIP STER

## (undated) DEVICE — MEDI-VAC YANKAUER SUCTION HANDLE: Brand: CARDINAL HEALTH

## (undated) DEVICE — TUBING, SUCTION, 1/4" X 10', STRAIGHT: Brand: MEDLINE

## (undated) DEVICE — GLOVE ORANGE PI 8   MSG9080

## (undated) DEVICE — SOLUTION IV IRRIG 500ML 0.9% SODIUM CHL 2F7123

## (undated) DEVICE — SOLUTION IRRIG 1000ML STRL H2O USP PLAS POUR BTL

## (undated) DEVICE — MARKER,SKIN,WI/RULER AND LABELS: Brand: MEDLINE

## (undated) DEVICE — DRESSING HYDROFIBER AQUACEL AG ADVANTAGE 3.5X10 IN

## (undated) DEVICE — SOLUTION PREP POVIDONE IOD FOR SKIN MUCOUS MEM PRIOR TO

## (undated) DEVICE — APPLICATOR MEDICATED 26 CC SOLUTION HI LT ORNG CHLORAPREP

## (undated) DEVICE — GLOVE ORTHO 8   MSG9480

## (undated) DEVICE — GLOVE SURG SZ 8 CRM LTX FREE POLYISOPRENE POLYMER BEAD ANTI

## (undated) DEVICE — SOLUTION SCRB 1% POVIDONE IOD BRN ANTIMIC SKIN CLN

## (undated) DEVICE — ELECTROSURGICAL PENCIL BUTTON SWITCH E-Z CLEAN COATED BLADE ELECTRODE 10 FT (3 M) CORD HOLSTER: Brand: MEGADYNE

## (undated) DEVICE — PADDING,UNDERCAST,COTTON, 4"X4YD STERILE: Brand: MEDLINE

## (undated) DEVICE — BANDAGE COMPR W6INXL5YD SELF ADH COHESIVE CO FLX

## (undated) DEVICE — SYRINGE IRRIG 60ML SFT PLIABLE BLB EZ TO GRP 1 HND USE W/

## (undated) DEVICE — PIN BNE FIX TEMP L140MM DIA4MM MAKO

## (undated) DEVICE — DRESSING GZ W1XL8IN COT XRFRM N ADH OVERWRAP CURAD

## (undated) DEVICE — KIT SURG W7XL11IN 2 PKT UNTREATED NA

## (undated) DEVICE — KIT TRK HIP PROC VIZADISC

## (undated) DEVICE — PATIENT RETURN ELECTRODE, SINGLE-USE, CONTACT QUALITY MONITORING, ADULT, WITH 9FT CORD, FOR PATIENTS WEIGING OVER 33LBS. (15KG): Brand: MEGADYNE

## (undated) DEVICE — 3M™ IOBAN™ 2 ANTIMICROBIAL INCISE DRAPE 6651EZ: Brand: IOBAN™ 2

## (undated) DEVICE — CHLORAPREP 26ML ORANGE

## (undated) DEVICE — SHEET SUPPORT

## (undated) DEVICE — DRAPE,TOP,102X53,STERILE: Brand: MEDLINE

## (undated) DEVICE — STRIP,CLOSURE,WOUND,MEDI-STRIP,1/4X3: Brand: MEDLINE

## (undated) DEVICE — BANDAGE COMPR W6INXL12FT SMOOTH FOR LIMB EXSANG ESMARCH

## (undated) DEVICE — CONTROL SYRINGE LUER-LOCK TIP: Brand: MONOJECT

## (undated) DEVICE — K-WIRE
Type: IMPLANTABLE DEVICE | Site: HIP | Status: NON-FUNCTIONAL
Brand: PRO
Removed: 2024-03-13

## (undated) DEVICE — PACK,EXTREMITY,ORTHOMAX,5/CS: Brand: MEDLINE